# Patient Record
Sex: MALE | Race: WHITE | Employment: OTHER | URBAN - METROPOLITAN AREA
[De-identification: names, ages, dates, MRNs, and addresses within clinical notes are randomized per-mention and may not be internally consistent; named-entity substitution may affect disease eponyms.]

---

## 2020-03-04 ENCOUNTER — OFFICE VISIT (OUTPATIENT)
Dept: PRIMARY CARE CLINIC | Age: 70
End: 2020-03-04
Payer: MEDICARE

## 2020-03-04 VITALS
HEART RATE: 90 BPM | SYSTOLIC BLOOD PRESSURE: 120 MMHG | WEIGHT: 221 LBS | RESPIRATION RATE: 14 BRPM | DIASTOLIC BLOOD PRESSURE: 78 MMHG | TEMPERATURE: 98.3 F | BODY MASS INDEX: 29.93 KG/M2 | OXYGEN SATURATION: 98 % | HEIGHT: 72 IN

## 2020-03-04 PROBLEM — M54.2 CHRONIC NECK PAIN: Status: ACTIVE | Noted: 2020-03-04

## 2020-03-04 PROBLEM — G47.00 INSOMNIA: Status: ACTIVE | Noted: 2020-03-04

## 2020-03-04 PROBLEM — M48.02 SPINAL STENOSIS, CERVICAL REGION: Status: ACTIVE | Noted: 2017-10-31

## 2020-03-04 PROBLEM — R91.8 MULTIPLE PULMONARY NODULES: Status: ACTIVE | Noted: 2020-03-04

## 2020-03-04 PROBLEM — E11.9 TYPE 2 DIABETES MELLITUS WITHOUT COMPLICATION, WITHOUT LONG-TERM CURRENT USE OF INSULIN (HCC): Status: ACTIVE | Noted: 2020-03-04

## 2020-03-04 PROBLEM — G89.29 CHRONIC NECK PAIN: Status: ACTIVE | Noted: 2020-03-04

## 2020-03-04 PROBLEM — R97.20 ELEVATED PSA: Status: ACTIVE | Noted: 2020-03-04

## 2020-03-04 LAB — HBA1C MFR BLD: 6.7 %

## 2020-03-04 PROCEDURE — 83036 HEMOGLOBIN GLYCOSYLATED A1C: CPT | Performed by: NURSE PRACTITIONER

## 2020-03-04 PROCEDURE — 99203 OFFICE O/P NEW LOW 30 MIN: CPT | Performed by: NURSE PRACTITIONER

## 2020-03-04 RX ORDER — AMOXICILLIN 500 MG/1
500 CAPSULE ORAL 3 TIMES DAILY
Qty: 30 CAPSULE | Refills: 0 | Status: SHIPPED | OUTPATIENT
Start: 2020-03-04 | End: 2020-03-14

## 2020-03-04 RX ORDER — GLIPIZIDE 10 MG/1
1 TABLET, FILM COATED, EXTENDED RELEASE ORAL 2 TIMES DAILY
COMMUNITY
Start: 2020-02-17 | End: 2020-07-16 | Stop reason: SDUPTHER

## 2020-03-04 RX ORDER — TRAZODONE HYDROCHLORIDE 50 MG/1
50 TABLET ORAL NIGHTLY PRN
Qty: 30 TABLET | Refills: 0 | Status: SHIPPED | OUTPATIENT
Start: 2020-03-04 | End: 2020-06-01

## 2020-03-04 RX ORDER — CITALOPRAM 20 MG/1
20 TABLET ORAL DAILY
COMMUNITY
End: 2020-07-16 | Stop reason: SDUPTHER

## 2020-03-04 RX ORDER — BENZONATATE 200 MG/1
200 CAPSULE ORAL 3 TIMES DAILY PRN
Qty: 30 CAPSULE | Refills: 0 | Status: SHIPPED | OUTPATIENT
Start: 2020-03-04 | End: 2020-03-11

## 2020-03-04 SDOH — ECONOMIC STABILITY: TRANSPORTATION INSECURITY
IN THE PAST 12 MONTHS, HAS LACK OF TRANSPORTATION KEPT YOU FROM MEETINGS, WORK, OR FROM GETTING THINGS NEEDED FOR DAILY LIVING?: NO

## 2020-03-04 SDOH — ECONOMIC STABILITY: INCOME INSECURITY: HOW HARD IS IT FOR YOU TO PAY FOR THE VERY BASICS LIKE FOOD, HOUSING, MEDICAL CARE, AND HEATING?: NOT HARD AT ALL

## 2020-03-04 SDOH — ECONOMIC STABILITY: TRANSPORTATION INSECURITY
IN THE PAST 12 MONTHS, HAS THE LACK OF TRANSPORTATION KEPT YOU FROM MEDICAL APPOINTMENTS OR FROM GETTING MEDICATIONS?: NO

## 2020-03-04 SDOH — ECONOMIC STABILITY: FOOD INSECURITY: WITHIN THE PAST 12 MONTHS, THE FOOD YOU BOUGHT JUST DIDN'T LAST AND YOU DIDN'T HAVE MONEY TO GET MORE.: NEVER TRUE

## 2020-03-04 SDOH — HEALTH STABILITY: MENTAL HEALTH: HOW OFTEN DO YOU HAVE A DRINK CONTAINING ALCOHOL?: NEVER

## 2020-03-04 SDOH — ECONOMIC STABILITY: FOOD INSECURITY: WITHIN THE PAST 12 MONTHS, YOU WORRIED THAT YOUR FOOD WOULD RUN OUT BEFORE YOU GOT MONEY TO BUY MORE.: NEVER TRUE

## 2020-03-04 ASSESSMENT — ENCOUNTER SYMPTOMS
SHORTNESS OF BREATH: 1
NAUSEA: 0
VOICE CHANGE: 0
ABDOMINAL PAIN: 0
CONSTIPATION: 0
WHEEZING: 0
VOMITING: 0
EYE PAIN: 0
COUGH: 0
BACK PAIN: 0
ABDOMINAL DISTENTION: 0
DIARRHEA: 0
SORE THROAT: 0
COLOR CHANGE: 0
BLOOD IN STOOL: 0
TROUBLE SWALLOWING: 0
EYE ITCHING: 0
CHEST TIGHTNESS: 0
EYE DISCHARGE: 0

## 2020-03-04 ASSESSMENT — PATIENT HEALTH QUESTIONNAIRE - PHQ9
SUM OF ALL RESPONSES TO PHQ QUESTIONS 1-9: 0
2. FEELING DOWN, DEPRESSED OR HOPELESS: 0
1. LITTLE INTEREST OR PLEASURE IN DOING THINGS: 0
SUM OF ALL RESPONSES TO PHQ9 QUESTIONS 1 & 2: 0
SUM OF ALL RESPONSES TO PHQ QUESTIONS 1-9: 0
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
## 2020-04-29 ENCOUNTER — TELEPHONE (OUTPATIENT)
Dept: PRIMARY CARE CLINIC | Age: 70
End: 2020-04-29

## 2020-06-01 ENCOUNTER — VIRTUAL VISIT (OUTPATIENT)
Dept: PRIMARY CARE CLINIC | Age: 70
End: 2020-06-01
Payer: MEDICARE

## 2020-06-01 VITALS — BODY MASS INDEX: 29.84 KG/M2 | WEIGHT: 220 LBS

## 2020-06-01 PROBLEM — Z80.0 FAMILY HISTORY OF COLON CANCER: Chronic | Status: ACTIVE | Noted: 2020-06-01

## 2020-06-01 PROCEDURE — 99214 OFFICE O/P EST MOD 30 MIN: CPT | Performed by: NURSE PRACTITIONER

## 2020-06-01 RX ORDER — TRAZODONE HYDROCHLORIDE 100 MG/1
100 TABLET ORAL NIGHTLY PRN
Qty: 30 TABLET | Refills: 0 | Status: SHIPPED | OUTPATIENT
Start: 2020-06-01 | End: 2020-12-07 | Stop reason: SINTOL

## 2020-06-01 ASSESSMENT — ENCOUNTER SYMPTOMS
ABDOMINAL DISTENTION: 0
COLOR CHANGE: 0
ABDOMINAL PAIN: 0
DIARRHEA: 0
BACK PAIN: 0
CONSTIPATION: 0
EYE PAIN: 0
BLOOD IN STOOL: 0
TROUBLE SWALLOWING: 0
SHORTNESS OF BREATH: 1
NAUSEA: 0
EYE DISCHARGE: 0
WHEEZING: 0
EYE ITCHING: 0
COUGH: 0
CHEST TIGHTNESS: 0
SORE THROAT: 0
VOMITING: 0
VOICE CHANGE: 0

## 2020-06-01 NOTE — PATIENT INSTRUCTIONS
COMPLETE FASTING LABS ASAP. DECLINES CSCOPE. WILLING TO DO HOME COLON CANCER SCREEN. COLOGUARD KIT ORDERED. CONTINUE PER PULM TO MONITOR LUNG NODULES ANNUALLY.  CT DUE 3/2021    MONITOR PSA ANNUALLY

## 2020-06-01 NOTE — PROGRESS NOTES
2020        TELEHEALTH EVALUATION -- Audio/Visual (During BBJAB-28 public health emergency)    HPI:    Dwain Lord (:  1950) has requested an audio/video evaluation for the following concern(s): chronic visit. Pt seen via doxy. me virtual visit. DMII:  On glipizide. Last A1C 6.7  Never been on metformin. Doesn't check glu at home. He was on statin in the past and caused too much joint pain so he stopped taking it  Not on ACEI. Doesn't want to take any more medication. Due for diabetic eye exam.   Pt never returned for fasting labs ordered in march. He will complete them this wk. PULM:  Former smoker. Mild COPD. Follows with pulm for scattered pulmonary nodules. Has CT chest annually from previous employer. Recent CT 3/2/2020 and was stable. ANXIETY:  On celexa. Doing well. No problems. SLEEP:  rx'd trazadone at last visit for insomnia. This took too long to kick in. He would like to go to sleep earlier. Can't fall asleep before 4 am, no matter what time he takes the trazadone. Would like to increase dose. PSA:  Last psa 2019 4.3 has been approx 4 since 2017. Denies any problems urinating. COLON CA SCREEN:  +FH colon cancer (twin brother). Had cscope about 10 yrs ago and was normal. Has refused any further colonoscopy. He is willing to do home testing, cologuard. Due for pneumonia and tetanus vaccines. Refuses peneumonia vaccine. States he will get a tetanus shot sometime soon. Due for AWV. Due for labs after     Review of Systems   Constitutional: Negative for activity change, appetite change, chills, fatigue and unexpected weight change. HENT: Negative for congestion, ear pain, hearing loss, nosebleeds, postnasal drip, sneezing, sore throat, trouble swallowing and voice change. Eyes: Negative for pain, discharge and itching. Respiratory: Positive for shortness of breath. Negative for cough, chest tightness and wheezing.          Pulmonary nodules treatment and/or call 911 if deemed necessary. Patient identification was verified at the start of the visit: Yes    Total time spent on this encounter: Not billed by time    Services were provided through a video synchronous discussion virtually to substitute for in-person clinic visit. Patient and provider were located at their individual homes. --JORDAN Bailon - CNP on 6/1/2020 at 4:14 PM    An electronic signature was used to authenticate this note.

## 2020-07-16 RX ORDER — GLIPIZIDE 10 MG/1
10 TABLET, FILM COATED, EXTENDED RELEASE ORAL 2 TIMES DAILY
Qty: 60 TABLET | Refills: 2 | Status: SHIPPED | OUTPATIENT
Start: 2020-07-16 | End: 2020-08-20 | Stop reason: SDUPTHER

## 2020-07-16 RX ORDER — CITALOPRAM 20 MG/1
20 TABLET ORAL DAILY
Qty: 30 TABLET | Refills: 2 | Status: SHIPPED | OUTPATIENT
Start: 2020-07-16 | End: 2020-08-20 | Stop reason: SDUPTHER

## 2020-07-16 NOTE — TELEPHONE ENCOUNTER
Patient called needing refills on the following medications:   Completely out of     citalopram (CELEXA) 20 MG tablet [[id number]]     Order Details   Dose: 20 mg  Route: Oral  Frequency: DAILY    Dispense Quantity: --  Refills: --  Fills remaining: --             Sig: Take 20 mg by mouth daily        Also about a week left of the following:     glipiZIDE (GLUCOTROL XL) 10 MG extended release tablet [[id number]]     Order Details   Dose: 1 tablet  Route: Oral  Frequency: 2 TIMES DAILY    Dispense Quantity: --  Refills: --  Fills remaining: --             Sig: Take 1 tablet by mouth 2 times daily                 [address]    Thank you

## 2020-08-05 RX ORDER — GLUCOSAMINE HCL/CHONDROITIN SU 500-400 MG
CAPSULE ORAL
Qty: 100 STRIP | Refills: 3 | Status: SHIPPED | OUTPATIENT
Start: 2020-08-05 | End: 2020-08-06 | Stop reason: SDUPTHER

## 2020-08-05 NOTE — TELEPHONE ENCOUNTER
One touch Ultra test strip  Medication:   Requested Prescriptions     Pending Prescriptions Disp Refills    blood glucose monitor strips 30 strip 11     Sig: One Touch Ultra Test strips     Last appt: 6/1/2020   Next appt: Visit date not found    Last Labs DM:   Lab Results   Component Value Date    LABA1C 6.7 03/04/2020

## 2020-08-05 NOTE — TELEPHONE ENCOUNTER
----- Message from Priyanka Suehayley sent at 8/5/2020  5:11 PM EDT -----  Subject: Message to Provider    QUESTIONS  Information for Provider? Pt called and is needing a prescription sent   over for test strips. original script written by different doctor. ---------------------------------------------------------------------------  --------------  Ino Spearing INFO  What is the best way for the office to contact you? OK to leave message on   voicemail  Preferred Call Back Phone Number? [phone]  ---------------------------------------------------------------------------  --------------  SCRIPT ANSWERS  Relationship to Patient?  Self

## 2020-08-06 ENCOUNTER — TELEPHONE (OUTPATIENT)
Dept: PRIMARY CARE CLINIC | Age: 70
End: 2020-08-06

## 2020-08-06 RX ORDER — GLUCOSAMINE HCL/CHONDROITIN SU 500-400 MG
CAPSULE ORAL
Qty: 100 STRIP | Refills: 3 | Status: SHIPPED | OUTPATIENT
Start: 2020-08-06

## 2020-08-06 RX ORDER — GLUCOSAMINE HCL/CHONDROITIN SU 500-400 MG
100 CAPSULE ORAL 2 TIMES DAILY
Qty: 100 STRIP | Refills: 1 | Status: CANCELLED | OUTPATIENT
Start: 2020-08-06

## 2020-08-06 RX ORDER — DIPHENHYDRAMINE HYDROCHLORIDE 25 MG/1
CAPSULE, LIQUID FILLED ORAL
Qty: 1 KIT | Refills: 1 | Status: SHIPPED | OUTPATIENT
Start: 2020-08-06

## 2020-08-06 RX ORDER — DIPHENHYDRAMINE HYDROCHLORIDE 25 MG/1
CAPSULE, LIQUID FILLED ORAL
COMMUNITY
End: 2020-08-06 | Stop reason: SDUPTHER

## 2020-08-06 RX ORDER — DIPHENHYDRAMINE HYDROCHLORIDE 25 MG/1
CAPSULE, LIQUID FILLED ORAL
Qty: 1 KIT | Refills: 0 | Status: CANCELLED | OUTPATIENT
Start: 2020-08-06

## 2020-08-06 RX ORDER — GLUCOSAMINE HCL/CHONDROITIN SU 500-400 MG
100 CAPSULE ORAL
COMMUNITY

## 2020-08-06 NOTE — TELEPHONE ENCOUNTER
Medication:   Requested Prescriptions     Pending Prescriptions Disp Refills    Blood Glucose Monitoring Suppl (BLOOD GLUCOSE MONITOR SYSTEM) w/Device KIT 1 kit 0     Sig: by Does not apply route Whatever  Machine insurance  Covers  Diabetes e11.9    blood glucose monitor strips 100 strip 1     Si strips by Other route 2 times daily Test 2times a day & as needed for symptoms of irregular blood glucose. Whatever  Insurance  Covers  Diabetes e11.9    Lancets Misc. MISC 100 each 1     Sig: by Does not apply route 2 times daily       Last Filled:      Patient Phone Number: [phone] (home)     Last appt: 2020   Next appt: Visit date not found    Last Labs DM:   Lab Results   Component Value Date    LABA1C 6.7 2020       Last OARRS: No flowsheet data found.     Preferred Pharmacy:   [address] [facility] [place] [address]  Phone: [phone] Fax: [phone]

## 2020-08-06 NOTE — TELEPHONE ENCOUNTER
Pharmacy needs a clarification for max dose per day on the   blood glucose monitor strips [[id number]]     Order Details   Dose: 100 strip  Route: Other  Frequency: --    Dispense Quantity: --  Refills: --  Fills remaining: --             Si strips by Other route Test 2times a day & as needed for symptoms of irregular blood glucose.    Consuelo Petersen [id number] [facility], [facility]    Pharmacy Comments: --

## 2020-08-20 RX ORDER — GLIPIZIDE 10 MG/1
10 TABLET, FILM COATED, EXTENDED RELEASE ORAL 2 TIMES DAILY
Qty: 180 TABLET | Refills: 0 | Status: SHIPPED | OUTPATIENT
Start: 2020-08-20 | End: 2020-10-23 | Stop reason: SDUPTHER

## 2020-08-20 RX ORDER — CITALOPRAM 20 MG/1
20 TABLET ORAL DAILY
Qty: 90 TABLET | Refills: 0 | Status: SHIPPED | OUTPATIENT
Start: 2020-08-20 | End: 2020-10-23 | Stop reason: SDUPTHER

## 2020-08-20 NOTE — TELEPHONE ENCOUNTER
[employer] mailorder called for this refill:     Fax # [phone]    citalopram (CELEXA) 20 MG tablet [[id number]    glipiZIDE (GLUCOTROL XL) 10 MG extended release tablet [[id number]

## 2020-08-20 NOTE — TELEPHONE ENCOUNTER
Medication:   Requested Prescriptions     Pending Prescriptions Disp Refills    citalopram (CELEXA) 20 MG tablet 90 tablet 0     Sig: Take 1 tablet by mouth daily    glipiZIDE (GLUCOTROL XL) 10 MG extended release tablet 180 tablet 0     Sig: Take 1 tablet by mouth 2 times daily       Last Filled:      Patient Phone Number: [phone] (home)     Last appt: 6/1/2020   Next appt: Visit date not found    Last Labs DM:   Lab Results   Component Value Date    LABA1C 6.7 03/04/2020       Last OARRS: No flowsheet data found.     Preferred Pharmacy:   [address] [place] [address]  Phone: [phone] Fax: [phone]

## 2020-09-01 ENCOUNTER — TELEPHONE (OUTPATIENT)
Dept: PRIMARY CARE CLINIC | Age: 70
End: 2020-09-01

## 2020-09-01 NOTE — TELEPHONE ENCOUNTER
----- Message from Grecia Fan sent at 9/1/2020  3:52 PM EDT -----  Subject: Message to Provider    QUESTIONS  Information for Provider? pt stated he needs a renewal handicap sticker   ---------------------------------------------------------------------------  --------------  [address]  What is the best way for the office to contact you? OK to leave message on   voicemail  Preferred Call Back Phone Number? [phone]  ---------------------------------------------------------------------------  --------------  SCRIPT ANSWERS  Relationship to Patient?  Self

## 2020-09-08 ENCOUNTER — OFFICE VISIT (OUTPATIENT)
Dept: PRIMARY CARE CLINIC | Age: 70
End: 2020-09-08
Payer: MEDICARE

## 2020-09-08 VITALS
HEIGHT: 72 IN | DIASTOLIC BLOOD PRESSURE: 72 MMHG | BODY MASS INDEX: 31.18 KG/M2 | OXYGEN SATURATION: 97 % | WEIGHT: 230.2 LBS | SYSTOLIC BLOOD PRESSURE: 110 MMHG | RESPIRATION RATE: 16 BRPM | TEMPERATURE: 97.4 F | HEART RATE: 98 BPM

## 2020-09-08 DIAGNOSIS — E11.9 TYPE 2 DIABETES MELLITUS WITHOUT COMPLICATION, WITHOUT LONG-TERM CURRENT USE OF INSULIN (HCC): ICD-10-CM

## 2020-09-08 DIAGNOSIS — R97.20 ELEVATED PSA: ICD-10-CM

## 2020-09-08 PROBLEM — G89.29 CHRONIC PAIN OF LEFT KNEE: Status: ACTIVE | Noted: 2020-09-08

## 2020-09-08 PROBLEM — M25.562 CHRONIC PAIN OF LEFT KNEE: Status: ACTIVE | Noted: 2020-09-08

## 2020-09-08 LAB
A/G RATIO: 1.8 (ref 1.1–2.2)
ALBUMIN SERPL-MCNC: 4.4 G/DL (ref 3.4–5)
ALP BLD-CCNC: 111 U/L (ref 40–129)
ALT SERPL-CCNC: 14 U/L (ref 10–40)
ANION GAP SERPL CALCULATED.3IONS-SCNC: 11 MMOL/L (ref 3–16)
AST SERPL-CCNC: 17 U/L (ref 15–37)
BASOPHILS ABSOLUTE: 0.1 K/UL (ref 0–0.2)
BASOPHILS RELATIVE PERCENT: 0.6 %
BILIRUB SERPL-MCNC: 0.5 MG/DL (ref 0–1)
BUN BLDV-MCNC: 12 MG/DL (ref 7–20)
CALCIUM SERPL-MCNC: 9.3 MG/DL (ref 8.3–10.6)
CHLORIDE BLD-SCNC: 101 MMOL/L (ref 99–110)
CHOLESTEROL, TOTAL: 222 MG/DL (ref 0–199)
CO2: 28 MMOL/L (ref 21–32)
CREAT SERPL-MCNC: 1.4 MG/DL (ref 0.8–1.3)
CREATININE URINE: 193.1 MG/DL (ref 39–259)
EOSINOPHILS ABSOLUTE: 0.3 K/UL (ref 0–0.6)
EOSINOPHILS RELATIVE PERCENT: 2.9 %
GFR AFRICAN AMERICAN: >60
GFR NON-AFRICAN AMERICAN: 50
GLOBULIN: 2.4 G/DL
GLUCOSE BLD-MCNC: 232 MG/DL (ref 70–99)
HBA1C MFR BLD: 8.4 %
HCT VFR BLD CALC: 44.4 % (ref 40.5–52.5)
HDLC SERPL-MCNC: 46 MG/DL (ref 40–60)
HEMOGLOBIN: 14.8 G/DL (ref 13.5–17.5)
LDL CHOLESTEROL CALCULATED: 149 MG/DL
LYMPHOCYTES ABSOLUTE: 2.1 K/UL (ref 1–5.1)
LYMPHOCYTES RELATIVE PERCENT: 23.3 %
MCH RBC QN AUTO: 30.6 PG (ref 26–34)
MCHC RBC AUTO-ENTMCNC: 33.5 G/DL (ref 31–36)
MCV RBC AUTO: 91.3 FL (ref 80–100)
MICROALBUMIN UR-MCNC: <1.2 MG/DL
MICROALBUMIN/CREAT UR-RTO: NORMAL MG/G (ref 0–30)
MONOCYTES ABSOLUTE: 0.7 K/UL (ref 0–1.3)
MONOCYTES RELATIVE PERCENT: 7.7 %
NEUTROPHILS ABSOLUTE: 6 K/UL (ref 1.7–7.7)
NEUTROPHILS RELATIVE PERCENT: 65.5 %
PDW BLD-RTO: 13.2 % (ref 12.4–15.4)
PLATELET # BLD: 199 K/UL (ref 135–450)
PMV BLD AUTO: 8 FL (ref 5–10.5)
POTASSIUM SERPL-SCNC: 4.8 MMOL/L (ref 3.5–5.1)
RBC # BLD: 4.86 M/UL (ref 4.2–5.9)
SODIUM BLD-SCNC: 140 MMOL/L (ref 136–145)
TOTAL PROTEIN: 6.8 G/DL (ref 6.4–8.2)
TRIGL SERPL-MCNC: 137 MG/DL (ref 0–150)
VLDLC SERPL CALC-MCNC: 27 MG/DL
WBC # BLD: 9.2 K/UL (ref 4–11)

## 2020-09-08 PROCEDURE — 4040F PNEUMOC VAC/ADMIN/RCVD: CPT | Performed by: NURSE PRACTITIONER

## 2020-09-08 PROCEDURE — 3017F COLORECTAL CA SCREEN DOC REV: CPT | Performed by: NURSE PRACTITIONER

## 2020-09-08 PROCEDURE — 99214 OFFICE O/P EST MOD 30 MIN: CPT | Performed by: NURSE PRACTITIONER

## 2020-09-08 PROCEDURE — 83036 HEMOGLOBIN GLYCOSYLATED A1C: CPT | Performed by: NURSE PRACTITIONER

## 2020-09-08 PROCEDURE — G8417 CALC BMI ABV UP PARAM F/U: HCPCS | Performed by: NURSE PRACTITIONER

## 2020-09-08 PROCEDURE — G8427 DOCREV CUR MEDS BY ELIG CLIN: HCPCS | Performed by: NURSE PRACTITIONER

## 2020-09-08 PROCEDURE — 3052F HG A1C>EQUAL 8.0%<EQUAL 9.0%: CPT | Performed by: NURSE PRACTITIONER

## 2020-09-08 PROCEDURE — 1036F TOBACCO NON-USER: CPT | Performed by: NURSE PRACTITIONER

## 2020-09-08 PROCEDURE — 1123F ACP DISCUSS/DSCN MKR DOCD: CPT | Performed by: NURSE PRACTITIONER

## 2020-09-08 PROCEDURE — 2022F DILAT RTA XM EVC RTNOPTHY: CPT | Performed by: NURSE PRACTITIONER

## 2020-09-08 RX ORDER — MELATONIN 10 MG
1 CAPSULE ORAL NIGHTLY PRN
Qty: 30 CAPSULE | Refills: 2 | Status: SHIPPED | OUTPATIENT
Start: 2020-09-08 | End: 2021-03-08

## 2020-09-08 RX ORDER — LANCETS
EACH MISCELLANEOUS
COMMUNITY
Start: 2020-08-06

## 2020-09-08 ASSESSMENT — PATIENT HEALTH QUESTIONNAIRE - PHQ9
SUM OF ALL RESPONSES TO PHQ QUESTIONS 1-9: 0
1. LITTLE INTEREST OR PLEASURE IN DOING THINGS: 0
SUM OF ALL RESPONSES TO PHQ9 QUESTIONS 1 & 2: 0
SUM OF ALL RESPONSES TO PHQ QUESTIONS 1-9: 0
2. FEELING DOWN, DEPRESSED OR HOPELESS: 0

## 2020-09-08 ASSESSMENT — ENCOUNTER SYMPTOMS
ABDOMINAL DISTENTION: 0
COUGH: 0
ABDOMINAL PAIN: 0
BACK PAIN: 0
EYE PAIN: 0
CONSTIPATION: 0
CHEST TIGHTNESS: 0
EYE DISCHARGE: 0
BLOOD IN STOOL: 0
COLOR CHANGE: 0
TROUBLE SWALLOWING: 0
EYE ITCHING: 0
SHORTNESS OF BREATH: 1
VOICE CHANGE: 0
DIARRHEA: 0
SORE THROAT: 0
WHEEZING: 0
NAUSEA: 0
VOMITING: 0

## 2020-09-08 NOTE — PATIENT INSTRUCTIONS
CHECK LABS TODAY. PROCEED PENDING RESULTS. ADD JANUVIA TO GLIPIZIDE FOR YOUR DIABETES. FOCUS ON DIET. LOWER SUGAR INTAKE. CONTINUE TO MONITOR GLUCOSE AT HOME. CALL IF ANY PROBLEMS WITH MEDICATION. CALL -[facility] TO SCHEDULE ULTRASOUND OF RIGHT ANKLE. LETTER FOR DMV COMPLETED. TRIAL OF MELATONIN FOR SLEEP. COMPLETE COLOGUARD KIT AND MAIL IN. SEND IN COPY OF LIFELINE RESULTS.

## 2020-09-08 NOTE — PROGRESS NOTES
Practitioner)  JORDAN Santana - CNP as PCP - [facility] Empaneled Provider    Chief Complaint   Patient presents with    Diabetes    Anxiety      HPI:   Pt is here for a chronic visit. DMII:  On glipizide. Tried metformin. Didn't tolerate due to \"leg burning/pain\"  Checks occasionally at home and running 160s. Diet has been poor. Eating sweets. He was on statin in the past and caused too much joint pain so he stopped taking it  Not on ACEI. Doesn't want to take any more medication. Due for diabetic eye exam    ANXIETY:  On celexa. Doing well. No refill needed. SLEEP:  Not taking trazadone. Made his ears ring. Has been sleeping well lately except for the past few nights. PULM:  Former smoker. Mild COPD. Follows with pulm for scattered pulmonary nodules. Has CT chest annually from previous employer. Recent CT 3/2/2020 and was stable. PSA:  Last psa 9/2019 4.3 has been approx 4 since 2017. Denies any problems urinating. COLON CA SCREEN:  +FH colon cancer (twin brother). Had cscope about 10 yrs ago and was normal. Has refused any further colonoscopy. He received cologuard kit in the mail, but has not completed this. Due for pneumonia and tetanus vaccines. Refuses peneumonia vaccine. States he will get a tetanus shot sometime soon. Due for AWV. Never returned for fasting labs as previously ordered. Had recent lifeline screening completed. Had carotid study, bone density and US. No results yet. ANKLE:  Having pain in right ankle when he crosses his legs. Feels a nodule that is painful on his outer ankle. No swelling or discoloration. Some mild varicose veins. ROS:  Review of Systems   Constitutional: Negative for activity change, appetite change, chills, fatigue and unexpected weight change. HENT: Negative for congestion, ear pain, hearing loss, nosebleeds, postnasal drip, sneezing, sore throat, trouble swallowing and voice change.     Eyes: Negative for pain, discharge and itching. Respiratory: Positive for shortness of breath. Negative for cough, chest tightness and wheezing. Pulmonary nodules monitored per annual CT per pulm   Cardiovascular: Negative for chest pain, palpitations and leg swelling. Gastrointestinal: Negative for abdominal distention, abdominal pain, blood in stool, constipation, diarrhea, nausea and vomiting. Endocrine: Negative for cold intolerance and heat intolerance. Genitourinary: Negative for difficulty urinating and dysuria. History of elevated PSA   Musculoskeletal: Negative for arthralgias, back pain, neck pain and neck stiffness. Skin: Negative for color change, pallor and rash. Neurological: Negative for dizziness, tremors, seizures, numbness and headaches. Hematological: Does not bruise/bleed easily. Psychiatric/Behavioral: Positive for sleep disturbance. Negative for agitation. The patient is not nervous/anxious. VITALS:  /72   Pulse 98   Temp 97.4 °F (36.3 °C) (Oral)   Resp 16   Ht 6' (1.829 m)   Wt 230 lb 3.2 oz (104.4 kg)   SpO2 97%   BMI 31.22 kg/m²    Wt Readings from Last 3 Encounters:   09/08/20 230 lb 3.2 oz (104.4 kg)   06/01/20 220 lb (99.8 kg)   03/04/20 221 lb (100.2 kg)       PE:  Physical Exam  Vitals signs and nursing note reviewed. Constitutional:       General: He is not in acute distress. Appearance: Normal appearance. He is well-developed and normal weight. He is not diaphoretic. HENT:      Head: Normocephalic and atraumatic. Right Ear: Tympanic membrane, ear canal and external ear normal.      Left Ear: Tympanic membrane, ear canal and external ear normal.      Nose: No congestion or rhinorrhea. Neck:      Musculoskeletal: Normal range of motion. No neck rigidity. Vascular: No carotid bruit. Cardiovascular:      Rate and Rhythm: Normal rate and regular rhythm. Pulses:           Dorsalis pedis pulses are 3+ on the right side.         Posterior tibial pulses are 3+ on the right side. Heart sounds: Normal heart sounds. No murmur. No friction rub. Pulmonary:      Effort: Pulmonary effort is normal. No respiratory distress. Breath sounds: No wheezing, rhonchi or rales. Abdominal:      General: Abdomen is flat. There is no distension. Palpations: Abdomen is soft. Musculoskeletal: Normal range of motion. General: No tenderness or deformity. Right lower leg: No edema. Left lower leg: No edema. Right foot: Normal range of motion. No deformity. Feet:    Feet:      Right foot:      Skin integrity: Skin integrity normal.      Toenail Condition: Right toenails are normal.   Lymphadenopathy:      Cervical: No cervical adenopathy. Skin:     General: Skin is warm and dry. Coloration: Skin is not pale. Findings: No erythema or rash. Neurological:      General: No focal deficit present. Mental Status: He is alert and oriented to person, place, and time. Motor: No weakness. Gait: Gait normal.   Psychiatric:         Mood and Affect: Mood normal.         Behavior: Behavior normal.      Visual inspection:  Deformity/amputation: absent  Skin lesions/pre-ulcerative calluses: absent  Edema: right- negative, left- negative    Sensory exam:  Monofilament sensation: normal  (minimum of 5 random plantar locations tested, avoiding callused areas - > 1 area with absence of sensation is + for neuropathy)    Plus at least one of the following:  Pulses: normal,   Pinprick: N/A  Proprioception: N/A  Vibration (128 Hz): N/A      Results for POC orders placed in visit on 09/08/20   POCT glycosylated hemoglobin (Hb A1C)   Result Value Ref Range    Hemoglobin A1C 8.4 %       ASSESSMENT/PLAN:  1. Type 2 diabetes mellitus without complication, without long-term current use of insulin ([facility])  Discussed A1C of 8.4. pt admits diet has been poor. Will add [place] and [place]. Discussed med in detail. Will call if any problems.  Has not tolerated metformin in the past. Continue on glipizide BID. Will focus on diet and cut out sweets. Will recheck in 3mo. Advised due for eye exam.   - POCT glycosylated hemoglobin (Hb A1C)  - SITagliptin (JANUVIA) 100 MG tablet; Take 1 tablet by mouth daily  Dispense: 90 tablet; Refill: 0    2. Multiple pulmonary nodules  Continue per pulm annually for monitoring of CT. Last CT 3/2020    3. Anxiety state  Stable on current regimen. No refill needed today. 4. Elevated PSA  Check levels. History of PSA 4 the past few yrs. 5. Family history of colon cancer  Refuses cscope. Strongly encouraged to complete cologuard, which he has at home. 6. Insomnia, unspecified type  Trial of melatonin PRN. Didn't tolerate higher dose of trazadone.   - Melatonin 10 MG CAPS; Take 1 capsule by mouth nightly as needed (sleep)  Dispense: 30 capsule; Refill: 2    7. Chronic pain of right ankle  Check US of RLE ankle. ?cyst. Pt will call and schedule. - US EXTREMITY RIGHT NON VASC LIMITED; Future    8. Chronic left knee pain  Letter for DMV printed and given to pt      Return in about 3 months (around 12/8/2020) for chronic visit.      Electronically signed by JORDAN Medina CNP on 9/8/2020 at 3:45 PM

## 2020-09-08 NOTE — LETTER
[address] [place] [address]  Phone: [phone]  Fax: [phone]    JORDAN Mendoza CNP        September 8, 2020     Patient: Deepthi Sprague   YOB: 1950   Date of Visit: 9/8/2020       To Whom It May Concern: It is my medical opinion that Deepthi Sprague requires a disability parking placard for the following reasons:  He has limited walking ability due to an arthritic and an orthopedic condition. Duration of need: 5 years    If you have any questions or concerns, please don't hesitate to call.     Sincerely,        JORDAN Mendoza CNP

## 2020-09-09 LAB
ESTIMATED AVERAGE GLUCOSE: 200.1 MG/DL
HBA1C MFR BLD: 8.6 %
PROSTATE SPECIFIC ANTIGEN: 3.74 NG/ML (ref 0–4)

## 2020-09-10 ENCOUNTER — HOSPITAL ENCOUNTER (OUTPATIENT)
Dept: ULTRASOUND IMAGING | Age: 70
Discharge: HOME | End: 2020-09-10
Payer: MEDICARE

## 2020-09-10 PROCEDURE — 76882 US LMTD JT/FCL EVL NVASC XTR: CPT

## 2020-10-22 NOTE — TELEPHONE ENCOUNTER
Medication:   Requested Prescriptions     Pending Prescriptions Disp Refills    glipiZIDE (GLUCOTROL XL) 10 MG extended release tablet 180 tablet 0     Sig: Take 1 tablet by mouth 2 times daily    citalopram (CELEXA) 20 MG tablet 90 tablet 0     Sig: Take 1 tablet by mouth daily        Last Filled:      Patient Phone Number: [phone] (home)     Last appt: 9/8/2020   Next appt: 12/8/2020    Last OARRS: No flowsheet data found.     Preferred Pharmacy:   [address] [place] [address]  Phone: [phone] Fax: [phone]

## 2020-10-23 RX ORDER — GLIPIZIDE 10 MG/1
10 TABLET, FILM COATED, EXTENDED RELEASE ORAL 2 TIMES DAILY
Qty: 180 TABLET | Refills: 0 | Status: SHIPPED | OUTPATIENT
Start: 2020-10-23 | End: 2020-12-07 | Stop reason: SDUPTHER

## 2020-10-23 RX ORDER — CITALOPRAM 20 MG/1
20 TABLET ORAL DAILY
Qty: 90 TABLET | Refills: 0 | Status: SHIPPED | OUTPATIENT
Start: 2020-10-23 | End: 2020-12-07 | Stop reason: SDUPTHER

## 2020-12-07 ENCOUNTER — OFFICE VISIT (OUTPATIENT)
Dept: PRIMARY CARE CLINIC | Age: 70
End: 2020-12-07
Payer: MEDICARE

## 2020-12-07 VITALS
HEART RATE: 88 BPM | TEMPERATURE: 97.6 F | BODY MASS INDEX: 31.37 KG/M2 | HEIGHT: 72 IN | SYSTOLIC BLOOD PRESSURE: 118 MMHG | WEIGHT: 231.6 LBS | DIASTOLIC BLOOD PRESSURE: 80 MMHG | OXYGEN SATURATION: 97 %

## 2020-12-07 DIAGNOSIS — E11.9 TYPE 2 DIABETES MELLITUS WITHOUT COMPLICATION, WITHOUT LONG-TERM CURRENT USE OF INSULIN (HCC): ICD-10-CM

## 2020-12-07 DIAGNOSIS — R97.20 ELEVATED PSA: ICD-10-CM

## 2020-12-07 LAB
HBA1C MFR BLD: 7.4 %
HCT VFR BLD CALC: 47.2 % (ref 40.5–52.5)
HEMOGLOBIN: 16.1 G/DL (ref 13.5–17.5)
MCH RBC QN AUTO: 30.4 PG (ref 26–34)
MCHC RBC AUTO-ENTMCNC: 34.1 G/DL (ref 31–36)
MCV RBC AUTO: 89.1 FL (ref 80–100)
PDW BLD-RTO: 13.4 % (ref 12.4–15.4)
PLATELET # BLD: 223 K/UL (ref 135–450)
PMV BLD AUTO: 7.8 FL (ref 5–10.5)
RBC # BLD: 5.29 M/UL (ref 4.2–5.9)
WBC # BLD: 9.4 K/UL (ref 4–11)

## 2020-12-07 PROCEDURE — G8417 CALC BMI ABV UP PARAM F/U: HCPCS | Performed by: NURSE PRACTITIONER

## 2020-12-07 PROCEDURE — G8427 DOCREV CUR MEDS BY ELIG CLIN: HCPCS | Performed by: NURSE PRACTITIONER

## 2020-12-07 PROCEDURE — 3051F HG A1C>EQUAL 7.0%<8.0%: CPT | Performed by: NURSE PRACTITIONER

## 2020-12-07 PROCEDURE — 3017F COLORECTAL CA SCREEN DOC REV: CPT | Performed by: NURSE PRACTITIONER

## 2020-12-07 PROCEDURE — 4040F PNEUMOC VAC/ADMIN/RCVD: CPT | Performed by: NURSE PRACTITIONER

## 2020-12-07 PROCEDURE — 99214 OFFICE O/P EST MOD 30 MIN: CPT | Performed by: NURSE PRACTITIONER

## 2020-12-07 PROCEDURE — 2022F DILAT RTA XM EVC RTNOPTHY: CPT | Performed by: NURSE PRACTITIONER

## 2020-12-07 PROCEDURE — 83036 HEMOGLOBIN GLYCOSYLATED A1C: CPT | Performed by: NURSE PRACTITIONER

## 2020-12-07 PROCEDURE — 1036F TOBACCO NON-USER: CPT | Performed by: NURSE PRACTITIONER

## 2020-12-07 PROCEDURE — 1123F ACP DISCUSS/DSCN MKR DOCD: CPT | Performed by: NURSE PRACTITIONER

## 2020-12-07 PROCEDURE — G8484 FLU IMMUNIZE NO ADMIN: HCPCS | Performed by: NURSE PRACTITIONER

## 2020-12-07 RX ORDER — GLIPIZIDE 10 MG/1
10 TABLET, FILM COATED, EXTENDED RELEASE ORAL 2 TIMES DAILY
Qty: 180 TABLET | Refills: 0 | Status: SHIPPED | OUTPATIENT
Start: 2020-12-07 | End: 2021-01-27 | Stop reason: SDUPTHER

## 2020-12-07 RX ORDER — CITALOPRAM 20 MG/1
20 TABLET ORAL DAILY
Qty: 90 TABLET | Refills: 0 | Status: SHIPPED | OUTPATIENT
Start: 2020-12-07 | End: 2021-01-27 | Stop reason: SDUPTHER

## 2020-12-07 ASSESSMENT — ENCOUNTER SYMPTOMS
CHEST TIGHTNESS: 0
BACK PAIN: 0
TROUBLE SWALLOWING: 0
CONSTIPATION: 0
NAUSEA: 0
EYE DISCHARGE: 0
ABDOMINAL PAIN: 0
WHEEZING: 0
COLOR CHANGE: 0
SHORTNESS OF BREATH: 1
EYE ITCHING: 0
ABDOMINAL DISTENTION: 0
VOMITING: 0
DIARRHEA: 0
VOICE CHANGE: 0
EYE PAIN: 0
BLOOD IN STOOL: 0
COUGH: 0
SORE THROAT: 0

## 2020-12-07 ASSESSMENT — PATIENT HEALTH QUESTIONNAIRE - PHQ9
SUM OF ALL RESPONSES TO PHQ QUESTIONS 1-9: 0
SUM OF ALL RESPONSES TO PHQ9 QUESTIONS 1 & 2: 0
SUM OF ALL RESPONSES TO PHQ QUESTIONS 1-9: 0
2. FEELING DOWN, DEPRESSED OR HOPELESS: 0
SUM OF ALL RESPONSES TO PHQ QUESTIONS 1-9: 0
1. LITTLE INTEREST OR PLEASURE IN DOING THINGS: 0

## 2020-12-07 NOTE — PATIENT INSTRUCTIONS
Please complete colon cancer screening asap and mail back in. Check fasting labs today. Call to schedule apt with surgeon for right ankle nodule.

## 2020-12-07 NOTE — PROGRESS NOTES
januvia  Tried metformin. Didn't tolerate due to \"leg burning/pain\"  Checks occasionally at home and running 160s. He was on statin in the past and caused too much joint pain so he stopped taking it  Not on ACEI. \"Doesn't want to take any more medication\".   Due for diabetic eye exam    ANXIETY:  On celexa. Doing well. PULM:  Former smoker. Mild COPD. Follows with pulm for scattered pulmonary nodules. Has CT chest annually from previous employer. Recent CT 3/2/2020 and was stable. ORTHO:  Never called for apt for painful nodule on right ankle. PSA:  Last psa 9/2019 4.3 has been approx 4 since 2017. Denies any problems urinating.     COLON CA SCREEN:  +FH colon cancer (twin brother). Had cscope about 10 yrs ago and was normal. Has refused any further colonoscopy. He received cologuard kit in the mail, but has not completed this. Due for pneumonia and tetanus vaccines. refuses both  Refuses flu vaccine. Due for AWV. does not want to completed today. Had recent lifeline screening completed. Had carotid study, bone density and US. ROS:  Review of Systems   Constitutional: Negative for activity change, appetite change, chills, fatigue and unexpected weight change. HENT: Negative for congestion, ear pain, hearing loss, nosebleeds, postnasal drip, sneezing, sore throat, trouble swallowing and voice change. Eyes: Negative for pain, discharge and itching. Respiratory: Positive for shortness of breath (chronic). Negative for cough, chest tightness and wheezing. Pulmonary nodules monitored per annual CT per pulm   Cardiovascular: Negative for chest pain, palpitations and leg swelling. Gastrointestinal: Negative for abdominal distention, abdominal pain, blood in stool, constipation, diarrhea, nausea and vomiting. Endocrine: Negative for cold intolerance and heat intolerance. Genitourinary: Negative for difficulty urinating and dysuria.         History of elevated PSA Musculoskeletal: Negative for arthralgias, back pain, neck pain and neck stiffness. Skin: Negative for color change, pallor and rash. Neurological: Negative for dizziness, tremors, seizures, numbness and headaches. Hematological: Does not bruise/bleed easily. Psychiatric/Behavioral: Positive for sleep disturbance. Negative for agitation. The patient is not nervous/anxious. VITALS:  /80   Pulse 88   Temp 97.6 °F (36.4 °C) (Oral)   Ht 6' (1.829 m)   Wt 231 lb 9.6 oz (105.1 kg)   SpO2 97%   BMI 31.41 kg/m²      PE:  Physical Exam  Vitals signs and nursing note reviewed. Constitutional:       General: He is not in acute distress. Appearance: Normal appearance. He is well-developed and normal weight. He is not diaphoretic. HENT:      Head: Normocephalic and atraumatic. Nose: No congestion or rhinorrhea. Neck:      Musculoskeletal: Normal range of motion. No neck rigidity. Vascular: No carotid bruit. Cardiovascular:      Rate and Rhythm: Normal rate and regular rhythm. Pulses:           Dorsalis pedis pulses are 3+ on the right side. Posterior tibial pulses are 3+ on the right side. Heart sounds: Normal heart sounds. No murmur. No friction rub. Pulmonary:      Effort: Pulmonary effort is normal. No respiratory distress. Breath sounds: No wheezing, rhonchi or rales. Abdominal:      General: Abdomen is flat. There is no distension. Palpations: Abdomen is soft. Musculoskeletal: Normal range of motion. General: No tenderness or deformity. Right lower leg: No edema. Left lower leg: No edema. Right foot: Normal range of motion. No deformity. Feet:    Feet:      Right foot:      Skin integrity: Skin integrity normal.      Toenail Condition: Right toenails are normal.   Lymphadenopathy:      Cervical: No cervical adenopathy. Skin:     General: Skin is warm and dry. Coloration: Skin is not pale.       Findings: No erythema or rash. Neurological:      General: No focal deficit present. Mental Status: He is alert and oriented to person, place, and time. Motor: No weakness. Gait: Gait normal.   Psychiatric:         Mood and Affect: Mood normal.         Behavior: Behavior normal.        Lab Results   Component Value Date    LABA1C 7.4 12/07/2020     Lab Results   Component Value Date    .1 09/08/2020     Lab Results   Component Value Date    WBC 9.2 09/08/2020    HGB 14.8 09/08/2020    HCT 44.4 09/08/2020    MCV 91.3 09/08/2020     09/08/2020     Lab Results   Component Value Date     09/08/2020    K 4.8 09/08/2020     09/08/2020    CO2 28 09/08/2020    BUN 12 09/08/2020    CREATININE 1.4 (H) 09/08/2020    GLUCOSE 232 (H) 09/08/2020    CALCIUM 9.3 09/08/2020    PROT 6.8 09/08/2020    LABALBU 4.4 09/08/2020    BILITOT 0.5 09/08/2020    ALKPHOS 111 09/08/2020    AST 17 09/08/2020    ALT 14 09/08/2020    LABGLOM 50 (A) 09/08/2020    GFRAA >60 09/08/2020    AGRATIO 1.8 09/08/2020    GLOB 2.4 09/08/2020       Lab Results   Component Value Date    PSA 3.74 09/08/2020       ASSESSMENT/PLAN:  1. Type 2 diabetes mellitus without complication, without long-term current use of insulin (HCC)  Check fasting labs today. Proceed pending results. Will continue to work on diet. Encouraged to check glu regularly and keep glu log.     - POCT glycosylated hemoglobin (Hb A1C)  - glipiZIDE (GLUCOTROL XL) 10 MG extended release tablet; Take 1 tablet by mouth 2 times daily  Dispense: 180 tablet; Refill: 0  - SITagliptin (JANUVIA) 100 MG tablet; Take 1 tablet by mouth daily  Dispense: 90 tablet; Refill: 0  - CBC; Future  - Comprehensive Metabolic Panel; Future  - Lipid Panel; Future  - Hemoglobin A1C; Future    2. Family history of colon cancer  Strongly encouraged colonoscopy. Pt refuses. Has cologuard kit at home, but has not completed. Strongly encouraged to complete asap. Pt agrees.      3. Elevated PSA  - Psa screening; Future    4. Multiple pulmonary nodules  Has CT yearly in March. No change in recent CT. 5. Anxiety state  Stable. 6. Chronic pain of right ankle  Pt was previously referred to ortho, but they were unable to get a hold of him to schedule. Contact info given for ortho. Encouraged pt to call for apt. Return in about 4 weeks (around 1/4/2021) for AWV.      Electronically signed by JORDAN Mart CNP on 12/7/2020 at 7:28 PM

## 2020-12-08 LAB
A/G RATIO: 1.7 (ref 1.1–2.2)
ALBUMIN SERPL-MCNC: 4.5 G/DL (ref 3.4–5)
ALP BLD-CCNC: 97 U/L (ref 40–129)
ALT SERPL-CCNC: 15 U/L (ref 10–40)
ANION GAP SERPL CALCULATED.3IONS-SCNC: 12 MMOL/L (ref 3–16)
AST SERPL-CCNC: 19 U/L (ref 15–37)
BILIRUB SERPL-MCNC: 0.5 MG/DL (ref 0–1)
BUN BLDV-MCNC: 9 MG/DL (ref 7–20)
CALCIUM SERPL-MCNC: 9.2 MG/DL (ref 8.3–10.6)
CHLORIDE BLD-SCNC: 101 MMOL/L (ref 99–110)
CHOLESTEROL, TOTAL: 201 MG/DL (ref 0–199)
CO2: 24 MMOL/L (ref 21–32)
CREAT SERPL-MCNC: 0.9 MG/DL (ref 0.8–1.3)
ESTIMATED AVERAGE GLUCOSE: 180 MG/DL
GFR AFRICAN AMERICAN: >60
GFR NON-AFRICAN AMERICAN: >60
GLOBULIN: 2.7 G/DL
GLUCOSE BLD-MCNC: 169 MG/DL (ref 70–99)
HBA1C MFR BLD: 7.9 %
HDLC SERPL-MCNC: 38 MG/DL (ref 40–60)
LDL CHOLESTEROL CALCULATED: 141 MG/DL
POTASSIUM SERPL-SCNC: 4.4 MMOL/L (ref 3.5–5.1)
PROSTATE SPECIFIC ANTIGEN: 3.47 NG/ML (ref 0–4)
SODIUM BLD-SCNC: 137 MMOL/L (ref 136–145)
TOTAL PROTEIN: 7.2 G/DL (ref 6.4–8.2)
TRIGL SERPL-MCNC: 111 MG/DL (ref 0–150)
VLDLC SERPL CALC-MCNC: 22 MG/DL

## 2021-01-27 ENCOUNTER — TELEMEDICINE (OUTPATIENT)
Dept: PRIMARY CARE CLINIC | Age: 71
End: 2021-01-27
Payer: MEDICARE

## 2021-01-27 DIAGNOSIS — R91.8 MULTIPLE PULMONARY NODULES: ICD-10-CM

## 2021-01-27 DIAGNOSIS — E11.9 TYPE 2 DIABETES MELLITUS WITHOUT COMPLICATION, WITHOUT LONG-TERM CURRENT USE OF INSULIN (HCC): ICD-10-CM

## 2021-01-27 DIAGNOSIS — Z00.00 ROUTINE GENERAL MEDICAL EXAMINATION AT A HEALTH CARE FACILITY: Primary | ICD-10-CM

## 2021-01-27 DIAGNOSIS — Z28.21 INFLUENZA VACCINE REFUSED: ICD-10-CM

## 2021-01-27 DIAGNOSIS — Z80.0 FAMILY HISTORY OF COLON CANCER: Chronic | ICD-10-CM

## 2021-01-27 DIAGNOSIS — G89.29 CHRONIC PAIN OF LEFT KNEE: ICD-10-CM

## 2021-01-27 DIAGNOSIS — F41.1 ANXIETY STATE: ICD-10-CM

## 2021-01-27 DIAGNOSIS — M25.562 CHRONIC PAIN OF LEFT KNEE: ICD-10-CM

## 2021-01-27 PROCEDURE — 1123F ACP DISCUSS/DSCN MKR DOCD: CPT | Performed by: NURSE PRACTITIONER

## 2021-01-27 PROCEDURE — G8484 FLU IMMUNIZE NO ADMIN: HCPCS | Performed by: NURSE PRACTITIONER

## 2021-01-27 PROCEDURE — 3017F COLORECTAL CA SCREEN DOC REV: CPT | Performed by: NURSE PRACTITIONER

## 2021-01-27 PROCEDURE — 3046F HEMOGLOBIN A1C LEVEL >9.0%: CPT | Performed by: NURSE PRACTITIONER

## 2021-01-27 PROCEDURE — G0438 PPPS, INITIAL VISIT: HCPCS | Performed by: NURSE PRACTITIONER

## 2021-01-27 PROCEDURE — 4040F PNEUMOC VAC/ADMIN/RCVD: CPT | Performed by: NURSE PRACTITIONER

## 2021-01-27 RX ORDER — CITALOPRAM 20 MG/1
20 TABLET ORAL DAILY
Qty: 90 TABLET | Refills: 0 | Status: SHIPPED | OUTPATIENT
Start: 2021-01-27 | End: 2021-10-04

## 2021-01-27 RX ORDER — GLIPIZIDE 10 MG/1
10 TABLET, FILM COATED, EXTENDED RELEASE ORAL 2 TIMES DAILY
Qty: 180 TABLET | Refills: 0 | Status: SHIPPED | OUTPATIENT
Start: 2021-01-27 | End: 2021-07-01 | Stop reason: SDUPTHER

## 2021-01-27 ASSESSMENT — PATIENT HEALTH QUESTIONNAIRE - PHQ9
2. FEELING DOWN, DEPRESSED OR HOPELESS: 0
SUM OF ALL RESPONSES TO PHQ QUESTIONS 1-9: 0
2. FEELING DOWN, DEPRESSED OR HOPELESS: 0
SUM OF ALL RESPONSES TO PHQ QUESTIONS 1-9: 0
SUM OF ALL RESPONSES TO PHQ QUESTIONS 1-9: 0

## 2021-01-27 NOTE — PATIENT INSTRUCTIONS
Advance Directives: Care Instructions  Overview  An advance directive is a legal way to state your wishes at the end of your life. It tells your family and your doctor what to do if you can't say what you want. There are two main types of advance directives. You can change them any time your wishes change. Living will. This form tells your family and your doctor your wishes about life support and other treatment. The form is also called a declaration. Medical power of . This form lets you name a person to make treatment decisions for you when you can't speak for yourself. This person is called a health care agent (health care proxy, health care surrogate). The form is also called a durable power of  for health care. If you do not have an advance directive, decisions about your medical care may be made by a family member, or by a doctor or a  who doesn't know you. It may help to think of an advance directive as a gift to the people who care for you. If you have one, they won't have to make tough decisions by themselves. Follow-up care is a key part of your treatment and safety. Be sure to make and go to all appointments, and call your doctor if you are having problems. It's also a good idea to know your test results and keep a list of the medicines you take. What should you include in an advance directive? Many states have a unique advance directive form. (It may ask you to address specific issues.) Or you might use a universal form that's approved by many states. If your form doesn't tell you what to address, it may be hard to know what to include in your advance directive. Use the questions below to help you get started. · Who do you want to make decisions about your medical care if you are not able to? · What life-support measures do you want if you have a serious illness that gets worse over time or can't be cured? The kinds of foods you eat have a big impact on both your weight and your health. Reaching and staying at a healthy weight is not about going on a diet. It's about making healthier food choices every day and changing your diet for good. Healthy eating means eating a variety of foods so that you get all the nutrients you need. Your body needs protein, carbohydrate, and fats for energy. They keep your heart beating, your brain active, and your muscles working. On most days, try to eat from each food group. This means eating a variety of:  · Whole grains, such as whole wheat breads and pastas. · Fruits and vegetables. · Dairy products, such as low-fat milk, yogurt, and cheese. · Lean proteins, such as all types of fish, chicken without the skin, and beans. Don't have too much or too little of one thing. All foods, if eaten in moderation, can be part of healthy eating. Even sweets can be okay. If your favorite foods are high in fat, salt, sugar, or calories, limit how often you eat them. Eat smaller servings, or look for healthy substitutes. Do watch what you eat  Many people eat more than their bodies need. Part of staying at a healthy weight means learning how much food you really need from day to day and not eating more than that. Even with healthy foods, eating too much can make you gain weight. Having a well-balanced diet means that you eat enough, but not too much, and that your food gives you the nutrients you need to stay healthy. So listen to your body. Eat when you're hungry. Stop when you feel satisfied. It's a good idea to have healthy snacks ready for when you get hungry. Keep healthy snacks with you at work, in your car, and at home. If you have a healthy snack easily available, you'll be less likely to pick a candy bar or bag of chips from a vending machine instead. Some healthy snacks you might want to keep on hand are fruit, low-fat yogurt, string cheese, low-fat microwave popcorn, raisins and other dried fruit, nuts, whole wheat crackers, pretzels, carrots, celery sticks, and broccoli. Do some physical activity   A big part of reaching and staying at a healthy weight is being active. When you're active, you burn calories. This makes it easier to reach and stay at a healthy weight. When you're active on a regular basis, your body burns more calories, even when you're at rest. Being active helps you lose fat and build lean muscle. Try to be active for at least 1 hour every day. This may sound like a lot, but it's okay to be active in smaller blocks of time that add up to 1 hour a day. Any activity that makes your heart beat faster and keeps it there for a while counts. A brisk walk, run, or swim will get your heart beating faster. So will climbing stairs, shooting baskets, or cycling. Even some household chores like vacuuming and mowing the lawn will get your heart rate up. Pick activities that you enjoyones that make your heart beat faster, your muscles stronger, and your muscles and joints more flexible. If you find more than one thing you like doing, do them all. You don't have to do the same thing every day. Don't diet  Diets don't work. Diets are temporary. Because you give up so much when you diet, you may be hungry and think about food all the time. And after you stop dieting, you also may overeat to make up for what you missed. Most people who diet end up gaining back the pounds they lostand more. Remember that healthy bodies come in lots of shapes and sizes. Everyone can get healthier by eating better and being more active. Where can you learn more? Go to https://chencho.[employer]. org and sign in to your [employer] account. Enter 070 [id number] in the [employer] box to learn more about \"Learning About Healthy Weight. \" · You need 9989-2024 mg of calcium and 4840-5019 IU of vitamin D per day. It is possible to meet your calcium requirement with diet alone, but a vitamin D supplement is usually necessary to meet this goal.  · When exposed to the sun, use a sunscreen that protects against both UVA and UVB radiation with an SPF of 30 or greater. Reapply every 2 to 3 hours or after sweating, drying off with a towel, or swimming. · Always wear a seat belt when traveling in a car. Always wear a helmet when riding a bicycle or motorcycle. Patient Education        Preventing Falls: Care Instructions  Your Care Instructions     Getting around your home safely can be a challenge if you have injuries or health problems that make it easy for you to fall. Loose rugs and furniture in walkways are among the dangers for many older people who have problems walking or who have poor eyesight. People who have conditions such as arthritis, osteoporosis, or dementia also have to be careful not to fall. You can make your home safer with a few simple measures. Follow-up care is a key part of your treatment and safety. Be sure to make and go to all appointments, and call your doctor if you are having problems. It's also a good idea to know your test results and keep a list of the medicines you take. How can you care for yourself at home? Taking care of yourself  You may get dizzy if you do not drink enough water. To prevent dehydration, drink plenty of fluids, enough so that your urine is light yellow or clear like water. Choose water and other caffeine-free clear liquids. If you have kidney, heart, or liver disease and have to limit fluids, talk with your doctor before you increase the amount of fluids you drink. Exercise regularly to improve your strength, muscle tone, and balance. Walk if you can. Swimming may be a good choice if you cannot walk easily. Have your vision and hearing checked each year or any time you notice a change. If you have trouble seeing and hearing, you might not be able to avoid objects and could lose your balance. Know the side effects of the medicines you take. Ask your doctor or pharmacist whether the medicines you take can affect your balance. Sleeping pills or sedatives can affect your balance. Limit the amount of alcohol you drink. Alcohol can impair your balance and other senses. Ask your doctor whether calluses or corns on your feet need to be removed. If you wear loose-fitting shoes because of calluses or corns, you can lose your balance and fall. Talk to your doctor if you have numbness in your feet. Preventing falls at home  Remove raised doorway thresholds, throw rugs, and clutter. Repair loose carpet or raised areas in the floor. Move furniture and electrical cords to keep them out of walking paths. Use nonskid floor wax, and wipe up spills right away, especially on ceramic tile floors. If you use a walker or cane, put rubber tips on it. If you use crutches, clean the bottoms of them regularly with an abrasive pad, such as steel wool. Keep your house well lit, especially stairways, porches, and outside walkways. Use night-lights in areas such as hallways and bathrooms. Add extra light switches or use remote switches (such as switches that go on or off when you clap your hands) to make it easier to turn lights on if you have to get up during the night. Install sturdy handrails on stairways. Move items in your cabinets so that the things you use a lot are on the lower shelves (about waist level). Keep a cordless phone and a flashlight with new batteries by your bed. If possible, put a phone in each of the main rooms of your house, or carry a cell phone in case you fall and cannot reach a phone. Or, you can wear a device around your neck or wrist. You push a button that sends a signal for help.

## 2021-01-27 NOTE — PROGRESS NOTES
Accu-Chek FastClix Lancets MISC USE 1 TO CHECK GLUCOSE TWICE DAILY Yes Historical Provider, MD   blood glucose monitor strips 100 strips by Other route Test 2times a day & as needed for symptoms of irregular blood glucose. Whatever  Insurance  Covers  Diabetes e11.9 Yes Historical Provider, MD   Lancets Misc. MISC CHECK GLU BID AND PRN Yes JORDAN Del Valle CNP   blood glucose monitor strips Check glu BID and PRN Yes JORDAN Velez CNP   Blood Glucose Monitoring Suppl (BLOOD GLUCOSE MONITOR SYSTEM) w/Device KIT Check glu BID and PRN Whatever  Machine insurance  Covers Diabetes e11.9 Yes JORDAN Velez CNP         Past Medical History:   Diagnosis Date    Anxiety     Depression     Type 2 diabetes mellitus without complication ([facility] [address]        Past Surgical History:   Procedure Laterality Date    KNEE ARTHROSCOPY      NECK SURGERY         History reviewed. No pertinent family history. CareTeam (Including outside providers/suppliers regularly involved in providing care):   Patient Care Team:  JORDAN Parker CNP as PCP - General (Family Nurse Practitioner)  JORDAN Parker CNP as PCP - [facility] Empaneled Provider    Wt Readings from Last 3 Encounters:   12/07/20 231 lb 9.6 oz (105.1 kg)   09/08/20 230 lb 3.2 oz (104.4 kg)   06/01/20 220 lb (99.8 kg)     Patient-Reported Vitals 1/27/2021   Patient-Reported Weight 220   Patient-Reported Height 6 foot      There is no height or weight on file to calculate BMI. Based upon direct observation of the patient, evaluation of cognition reveals recent and remote memory intact. Physical Exam  Constitutional:       Appearance: Normal appearance. HENT:      Head: Normocephalic and atraumatic. Nose: Nose normal.      Mouth/Throat:      Mouth: Mucous membranes are moist.      Pharynx: Oropharynx is clear. Neck:      Musculoskeletal: Normal range of motion.    Pulmonary: Effort: Pulmonary effort is normal. No respiratory distress. Musculoskeletal: Normal range of motion. Skin:     Coloration: Skin is not pale. Findings: No rash. Neurological:      General: No focal deficit present. Mental Status: He is alert. Cranial Nerves: No cranial nerve deficit. Psychiatric:         Mood and Affect: Mood normal.         Behavior: Behavior normal.       Patient's complete Health Risk Assessment and screening values have been reviewed and are found in Flowsheets. The following problems were reviewed today and where indicated follow up appointments were made and/or referrals ordered. Positive Risk Factor Screenings with Interventions:     Fall Risk:  Timed Up and Go Test > 12 seconds? (Complete if either Fall Risk answers are Yes): (!) yes  2 or more falls in past year?: no  Fall with injury in past year?: no  Fall Risk Interventions:    · Home safety tips provided  · Patient declines any further evaluation/treatment for this issue          General Health and ACP:  General  In general, how would you say your health is?: Good  In the past 7 days, have you experienced any of the following?  New or Increased Pain, New or Increased Fatigue, Loneliness, Social Isolation, Stress or Anger?: None of These  Do you get the social and emotional support that you need?: Yes  Do you have a Living Will?: (!) No  Advance Directives     Power of [address] [place] [address] Will ACP-Advance Directive ACP-Power of     Not on File Not on File Not on File Not on File      General Health Risk Interventions:  · No Living Will: Patient declines ACP discussion/assistance    Health Habits/Nutrition:  Health Habits/Nutrition  Do you exercise for at least 20 minutes 2-3 times per week?: (!) No  Have you lost any weight without trying in the past 3 months?: No  Do you eat fewer than 2 meals per day?: No  Have you seen a dentist within the past year?: (!) No     Health Habits/Nutrition Interventions: · Inadequate physical activity:  educational materials provided to promote increased physical activity    Hearing/Vision:  No exam data present  Hearing/Vision  Do you or your family notice any trouble with your hearing?: (!) Yes  Do you have difficulty driving, watching TV, or doing any of your daily activities because of your eyesight?: No  Have you had an eye exam within the past year?: (!) No  Hearing/Vision Interventions:  · Hearing concerns:  patient declines any further evaluation/treatment for hearing issues    Safety:  Safety  Do you have working smoke detectors?: Yes  Have all throw rugs been removed or fastened?: (!) No  Do you have non-slip mats or surfaces in all bathtubs/showers?: (!) No  Do all of your stairways have a railing or banister?: Yes  Are your doorways, halls and stairs free of clutter?: Yes  Do you always fasten your seatbelt when you are in a car?: Yes  Safety Interventions:  · Patient declines any further evaluation/treatment for this issue     Personalized Preventive Plan   Current Health Maintenance Status  Immunization History   Administered Date(s) Administered    Tdap (Boostrix, Adacel) 05/05/2010        Health Maintenance   Topic Date Due    Colon cancer screen colonoscopy  09/23/2001    Annual Wellness Visit (AWV)  02/27/2020    DTaP/Tdap/Td vaccine (2 - Td) 05/05/2020    AAA screen  03/04/2021 (Originally 1950)    Shingles Vaccine (1 of 2) 06/01/2021 (Originally 9/23/2001)    Pneumococcal 65+ years Vaccine (1 of 1 - PPSV23) 06/02/2021 (Originally 9/23/2016)    Flu vaccine (1) 01/27/2022 (Originally 9/1/2020)    Diabetic foot exam  09/08/2021    Diabetic microalbuminuria test  09/08/2021    Diabetic retinal exam  09/09/2021    A1C test (Diabetic or Prediabetic)  12/07/2021    Lipid screen  12/07/2021    Hepatitis A vaccine  Aged Out    Hib vaccine  Aged Out    Meningococcal (ACWY) vaccine  Aged Out    Hepatitis C screen  Discontinued Recommendations for Preventive Services Due: see orders and patient instructions/AVS.  . Recommended screening schedule for the next 5-10 years is provided to the patient in written form: see Patient Brennan Palacios was seen today for annual exam.    Diagnoses and all orders for this visit:    Routine general medical examination at a health care facility  [address] [place] [address] HANDOUTS  ADVISED  [facility] [address]  REFUSES STATIN  REFUSES FLU, PNEUMONIA AND TETANUS VACCINES. AAA SCREEN COMPLETED THROUGH [facility]. PATIENT WILL BRING IN COPY OF RESULTS. STRONGLY ENCOURAGED PATIENT TO COMPLETE COLOGUARD KIT FOR COLON CA SCREENING. REFUSES CSCOPE    Influenza vaccine refused    Type 2 diabetes mellitus without complication, without long-term current use of insulin (HCC)  -     SITagliptin (JANUVIA) 100 MG tablet; Take 1 tablet by mouth daily  -     glipiZIDE (GLUCOTROL XL) 10 MG extended release tablet; Take 1 tablet by mouth 2 times daily    Anxiety state  -     citalopram (CELEXA) 20 MG tablet; Take 1 tablet by mouth daily    Chronic pain of left knee  CONTINUE WITH COPPER KNEE BRACE. IF NO IMPROVEMENT, WILL NEED ORTHO REFERRAL    Family history of colon cancer  STRONGLY ADVISED TO COMPLETE COLOGUARD KIT. Multiple pulmonary nodules  ANNUAL CT DUE IN [facility].  MONITORED BY PREVIOUS EMPLOYER. Annette Wing is a 72 y.o. male being evaluated by a Virtual Visit (video and audio) encounter to address concerns as mentioned above. A caregiver was present when appropriate. Due to this being a TeleHealth encounter (During CVXHK-11 public health emergency), evaluation of the following organ systems was limited: Vitals/Constitutional/EENT/Resp/CV/GI//MS/Neuro/Skin/Heme-Lymph-Imm. Pursuant to the emergency declaration under the [address] [place] [address], [address] [place] [address] and the [employer] and [employer] Act, this Virtual Visit was conducted with patient's (and/or legal guardian's) consent, to reduce the patient's risk of exposure to COVID-19 and provide necessary medical care. The patient (and/or legal guardian) has also been advised to contact this office for worsening conditions or problems, and seek emergency medical treatment and/or call 911 if deemed necessary. Patient identification was verified at the start of the visit: Yes    Services were provided through a video synchronous discussion virtually to substitute for in-person clinic visit. Patient and provider were located at their individual homes. --JORDAN Duong CNP on 1/27/2021 at 1:59 PM    An electronic signature was used to authenticate this note.

## 2021-03-08 ENCOUNTER — OFFICE VISIT (OUTPATIENT)
Dept: PRIMARY CARE CLINIC | Age: 71
End: 2021-03-08
Payer: MEDICARE

## 2021-03-08 VITALS
BODY MASS INDEX: 30.61 KG/M2 | HEART RATE: 83 BPM | OXYGEN SATURATION: 97 % | WEIGHT: 226 LBS | TEMPERATURE: 98.2 F | HEIGHT: 72 IN | SYSTOLIC BLOOD PRESSURE: 120 MMHG | DIASTOLIC BLOOD PRESSURE: 75 MMHG

## 2021-03-08 DIAGNOSIS — R97.20 ELEVATED PSA: ICD-10-CM

## 2021-03-08 DIAGNOSIS — Z23 IMMUNIZATION DUE: ICD-10-CM

## 2021-03-08 DIAGNOSIS — R13.12 OROPHARYNGEAL DYSPHAGIA: ICD-10-CM

## 2021-03-08 DIAGNOSIS — M25.562 CHRONIC PAIN OF LEFT KNEE: ICD-10-CM

## 2021-03-08 DIAGNOSIS — E11.9 TYPE 2 DIABETES MELLITUS WITHOUT COMPLICATION, WITHOUT LONG-TERM CURRENT USE OF INSULIN (HCC): Primary | ICD-10-CM

## 2021-03-08 DIAGNOSIS — E11.9 TYPE 2 DIABETES MELLITUS WITHOUT COMPLICATION, WITHOUT LONG-TERM CURRENT USE OF INSULIN (HCC): ICD-10-CM

## 2021-03-08 DIAGNOSIS — G89.29 CHRONIC PAIN OF LEFT KNEE: ICD-10-CM

## 2021-03-08 DIAGNOSIS — R91.8 MULTIPLE PULMONARY NODULES: ICD-10-CM

## 2021-03-08 DIAGNOSIS — Z80.0 FAMILY HISTORY OF COLON CANCER: Chronic | ICD-10-CM

## 2021-03-08 DIAGNOSIS — H91.90 HEARING LOSS, UNSPECIFIED HEARING LOSS TYPE, UNSPECIFIED LATERALITY: ICD-10-CM

## 2021-03-08 LAB
A/G RATIO: 1.4 (ref 1.1–2.2)
ALBUMIN SERPL-MCNC: 4.2 G/DL (ref 3.4–5)
ALP BLD-CCNC: 125 U/L (ref 40–129)
ALT SERPL-CCNC: 13 U/L (ref 10–40)
ANION GAP SERPL CALCULATED.3IONS-SCNC: 10 MMOL/L (ref 3–16)
AST SERPL-CCNC: 17 U/L (ref 15–37)
BILIRUB SERPL-MCNC: 0.3 MG/DL (ref 0–1)
BUN BLDV-MCNC: 12 MG/DL (ref 7–20)
CALCIUM SERPL-MCNC: 9.3 MG/DL (ref 8.3–10.6)
CHLORIDE BLD-SCNC: 98 MMOL/L (ref 99–110)
CO2: 25 MMOL/L (ref 21–32)
CREAT SERPL-MCNC: 1.1 MG/DL (ref 0.8–1.3)
GFR AFRICAN AMERICAN: >60
GFR NON-AFRICAN AMERICAN: >60
GLOBULIN: 3.1 G/DL
GLUCOSE BLD-MCNC: 203 MG/DL (ref 70–99)
HCT VFR BLD CALC: 47.3 % (ref 40.5–52.5)
HEMOGLOBIN: 16 G/DL (ref 13.5–17.5)
MCH RBC QN AUTO: 29.8 PG (ref 26–34)
MCHC RBC AUTO-ENTMCNC: 33.7 G/DL (ref 31–36)
MCV RBC AUTO: 88.3 FL (ref 80–100)
PDW BLD-RTO: 13.7 % (ref 12.4–15.4)
PLATELET # BLD: 243 K/UL (ref 135–450)
PMV BLD AUTO: 7.7 FL (ref 5–10.5)
POTASSIUM SERPL-SCNC: 4.5 MMOL/L (ref 3.5–5.1)
RBC # BLD: 5.36 M/UL (ref 4.2–5.9)
SODIUM BLD-SCNC: 133 MMOL/L (ref 136–145)
TOTAL PROTEIN: 7.3 G/DL (ref 6.4–8.2)
WBC # BLD: 8.9 K/UL (ref 4–11)

## 2021-03-08 PROCEDURE — 99214 OFFICE O/P EST MOD 30 MIN: CPT | Performed by: NURSE PRACTITIONER

## 2021-03-08 ASSESSMENT — ENCOUNTER SYMPTOMS
EYE ITCHING: 0
NAUSEA: 0
EYE PAIN: 0
ABDOMINAL PAIN: 0
WHEEZING: 0
COLOR CHANGE: 0
VOICE CHANGE: 0
SHORTNESS OF BREATH: 1
COUGH: 0
VOMITING: 0
DIARRHEA: 0
EYE DISCHARGE: 0
CHEST TIGHTNESS: 0
BACK PAIN: 0
TROUBLE SWALLOWING: 1
ABDOMINAL DISTENTION: 0
CONSTIPATION: 0
SORE THROAT: 0
BLOOD IN STOOL: 0

## 2021-03-08 NOTE — PROGRESS NOTES
ENCOUNTER DATE: 3/8/2021     NAME: Annette Wing   AGE: 72 y.o. GENDER: male   YOB: 1950    Patient Active Problem List   Diagnosis    Spinal stenosis, cervical region    Multiple pulmonary nodules    Type 2 diabetes mellitus without complication, without long-term current use of insulin (HCC)    Anxiety state    Chronic neck pain    Insomnia    Elevated PSA    Family history of colon cancer    Chronic pain of left knee      Allergies   Allergen Reactions    Sulfamethoxazole-Trimethoprim Nausea And Vomiting     Makes sick       Current Outpatient Medications on File Prior to Visit   Medication Sig Dispense Refill    glipiZIDE (GLUCOTROL XL) 10 MG extended release tablet Take 1 tablet by mouth 2 times daily 180 tablet 0    citalopram (CELEXA) 20 MG tablet Take 1 tablet by mouth daily 90 tablet 0    Accu-Chek FastClix Lancets MISC USE 1 TO CHECK GLUCOSE TWICE DAILY      blood glucose monitor strips 100 strips by Other route Test 2times a day & as needed for symptoms of irregular blood glucose. Whatever  Insurance  Covers  Diabetes e11.9      Lancets Misc. MISC CHECK GLU BID AND  each 1    blood glucose monitor strips Check glu BID and  strip 3    Blood Glucose Monitoring Suppl (BLOOD GLUCOSE MONITOR SYSTEM) w/Device KIT Check glu BID and PRN Whatever  Machine insurance  Covers Diabetes e11.9 1 kit 1     No current facility-administered medications on file prior to visit.          Social History     Tobacco Use    Smoking status: Former Smoker     Packs/day: 2.00     Years: 20.00     Pack years: 40.00     Types: Cigarettes    Smokeless tobacco: Never Used   Substance Use Topics    Alcohol use: Never     Frequency: Never      CARE TEAM  Patient Care Team:  JORDAN Duong CNP as PCP - General (Family Nurse Practitioner)  JORDAN Duong CNP as PCP - [facility] Empaneled Provider    Chief Complaint   Patient presents with    Blood Work     follow up labs medication        HPI:   Patient is here for a chronic visit. DMII:  Last A1C 7.9  States glu have been running high, around 200s. He was on statin in the past and caused too much joint pain so he stopped taking it  Not on ACEI. \"Doesn't want to take any more medication\".   Due for diabetic eye exam  Not taking januvia due to cost.   Taking glipizide 10mg daily. Admits his diet has been poor. MOOD:  On celexa for depression and seems to be doing well  Has some difficulty going to sleep. Has tried multiple medications and don't help. Seems to be doing a little better. HTN:  Controlled without medication. Refuses to take ACEI. PULM:  Has CT chest annually from previous employer. Recent CT 3/2/2020 and was stable    COLON CA SCREEN:  +FH colon cancer (twin brother). Had cscope about 10 yrs ago and was normal. Has refused any further colonoscopy. He received cologuard kit in the mail, but has not completed this    AAA screen:  Completed per [facility]. Has report at home. ORTHO:  Had a fall last month. Has been using a cane since. Left knee gave out on him. Having a lot of knee pain. Would like ortho referral. Had previous surgery on left knee many years ago. Has had a few arthroscopes on his knee. ENT:  Requests ENT referral. States he has trouble swallowing at times and will get choked. Feels like he \"swallows his tongue\" at times and blocks his airway and gets choked pretty bad. States he had to see ENT many years ago for similar problem. Has had several neck surgeries where they entered through his throat. Feels like may be related to his previous surgeries. Also having some hearing loss he would like evaluated. ROS:  Review of Systems   Constitutional: Negative for activity change, appetite change, chills, fatigue and unexpected weight change. HENT: Positive for trouble swallowing.  Negative for congestion, ear pain, hearing loss, nosebleeds, postnasal drip, sneezing, sore throat and voice change. Hearing loss   Eyes: Negative for pain, discharge and itching. Respiratory: Positive for shortness of breath (chronic). Negative for cough, chest tightness and wheezing. Pulmonary nodules monitored per annual CT per pulm   Cardiovascular: Negative for chest pain, palpitations and leg swelling. Gastrointestinal: Negative for abdominal distention, abdominal pain, blood in stool, constipation, diarrhea, nausea and vomiting. Endocrine: Negative for cold intolerance and heat intolerance. Genitourinary: Negative for difficulty urinating and dysuria. History of elevated PSA   Musculoskeletal: Positive for arthralgias, gait problem and neck pain (s/p multiple surgeries). Negative for back pain and neck stiffness. Skin: Negative for color change, pallor and rash. Neurological: Negative for dizziness, tremors, seizures, numbness and headaches. Hematological: Does not bruise/bleed easily. Psychiatric/Behavioral: Positive for sleep disturbance. Negative for agitation. The patient is not nervous/anxious. VITALS:  /75   Pulse 83   Temp 98.2 °F (36.8 °C) (Infrared)   Ht 6' (1.829 m)   Wt 226 lb (102.5 kg)   SpO2 97%   BMI 30.65 kg/m²      PE:  Physical Exam  Vitals signs and nursing note reviewed. Constitutional:       General: He is not in acute distress. Appearance: Normal appearance. He is well-developed and normal weight. He is not diaphoretic. HENT:      Head: Normocephalic and atraumatic. Nose: No congestion or rhinorrhea. Neck:      Musculoskeletal: Normal range of motion. No neck rigidity. Vascular: No carotid bruit. Cardiovascular:      Rate and Rhythm: Normal rate and regular rhythm. Heart sounds: Normal heart sounds. No murmur. No friction rub. Pulmonary:      Effort: Pulmonary effort is normal. No respiratory distress. Breath sounds: No wheezing, rhonchi or rales. Abdominal:      General: Abdomen is flat. for eye exam.     - CBC; Future  - Comprehensive Metabolic Panel; Future  - Hemoglobin A1C; Future    2. Elevated PSA  Stable. 3. Family history of colon cancer  Discussed with patient several times the importance of colon cancer screening. Patient refuses cscope and has had cologuard kit to complete at home for a year. Strongly encouraged to complete screening. Patient agrees to cologuard only. 4. Multiple pulmonary nodules  Annual CT due in march per specialists. States they contact him to schedule CT    5. Chronic pain of left knee  Refer to ortho for further eval/treatment of his left knee. Continue to ambulate with cane. - Robert Edge MD, Orthopedic Surgery, [facility]    6. Oropharyngeal dysphagia  Refer to ENT per patient request. Will call and schedule apt. - [facility]- ENT    7. Hearing loss, unspecified hearing loss type, unspecified laterality  Refer to ENT per patient request. Will call and schedule apt.     8. Immunization due  Refuses shingles vaccine  Refuses flu vacine. Refuses pneumonia vaccine. Would like tetanus vaccine. Advised he has to obtain this at his local pharmacy due to insurance coverage. Return in about 3 months (around 6/8/2021) for chronic visit.      Electronically signed by JORDAN Basilio CNP on 3/8/2021 at 2:42 PM

## 2021-03-09 LAB
ESTIMATED AVERAGE GLUCOSE: 214.5 MG/DL
HBA1C MFR BLD: 9.1 %

## 2021-03-11 ENCOUNTER — VIRTUAL VISIT (OUTPATIENT)
Dept: PRIMARY CARE CLINIC | Age: 71
End: 2021-03-11
Payer: MEDICARE

## 2021-03-11 DIAGNOSIS — E11.65 UNCONTROLLED TYPE 2 DIABETES MELLITUS WITH HYPERGLYCEMIA (HCC): Primary | ICD-10-CM

## 2021-03-11 PROCEDURE — 99213 OFFICE O/P EST LOW 20 MIN: CPT | Performed by: NURSE PRACTITIONER

## 2021-03-11 ASSESSMENT — ENCOUNTER SYMPTOMS
EYE ITCHING: 0
SORE THROAT: 0
ABDOMINAL DISTENTION: 0
EYE PAIN: 0
COLOR CHANGE: 0
TROUBLE SWALLOWING: 1
WHEEZING: 0
NAUSEA: 0
VOMITING: 0
VOICE CHANGE: 0
CONSTIPATION: 0
ABDOMINAL PAIN: 0
CHEST TIGHTNESS: 0
COUGH: 0
BACK PAIN: 0
EYE DISCHARGE: 0
BLOOD IN STOOL: 0
SHORTNESS OF BREATH: 1
DIARRHEA: 0

## 2021-03-11 NOTE — PROGRESS NOTES
3/11/2021        TELEHEALTH EVALUATION -- Audio/Visual (During LUEQJ-67 public health emergency)    HPI:    Matty Parra (:  1950) has requested an audio/video evaluation for the following concern(s):    Patient seen virtually to discuss recent lab results. a1c now 9.1. On glipizide XL 10mg BID. Didn't tolerate metformin. Made his legs hurt and tingle.   [facility] [place] was too expensive. He tolerated this, but quit taking it due to cost.   Admits his diet has been poor. Has been eating a lot of lemon cake and cereal, rice etc.   Does not want to start insulin. Can't fathom the thought of having to take a daily shot. He is motivated to make dietary changes. a1c 3/2020 was 6.7 and he was strict with his diet. Review of Systems   Constitutional: Negative for activity change, appetite change, chills, fatigue and unexpected weight change. HENT: Positive for trouble swallowing. Negative for congestion, ear pain, hearing loss, nosebleeds, postnasal drip, sneezing, sore throat and voice change. Hearing loss   Eyes: Negative for pain, discharge and itching. Respiratory: Positive for shortness of breath (chronic). Negative for cough, chest tightness and wheezing. Pulmonary nodules monitored per annual CT per pulm   Cardiovascular: Negative for chest pain, palpitations and leg swelling. Gastrointestinal: Negative for abdominal distention, abdominal pain, blood in stool, constipation, diarrhea, nausea and vomiting. Endocrine: Negative for cold intolerance and heat intolerance. Genitourinary: Negative for difficulty urinating and dysuria. History of elevated PSA   Musculoskeletal: Positive for arthralgias, gait problem and neck pain (s/p multiple surgeries). Negative for back pain and neck stiffness. Skin: Negative for color change, pallor and rash. Neurological: Negative for dizziness, tremors, seizures, numbness and headaches. Hematological: Does not bruise/bleed easily. Psychiatric/Behavioral: Positive for sleep disturbance. Negative for agitation. The patient is not nervous/anxious. Prior to Visit Medications    Medication Sig Taking? Authorizing Provider   dapagliflozin (FARXIGA) 5 MG tablet Take 1 tablet by mouth every morning Yes JORDAN Del Valle CNP   glipiZIDE (GLUCOTROL XL) 10 MG extended release tablet Take 1 tablet by mouth 2 times daily Yes JORDAN Del Valle CNP   Accu-Chek FastClix Lancets MISC USE 1 TO CHECK GLUCOSE TWICE DAILY Yes Historical Provider, MD   blood glucose monitor strips 100 strips by Other route Test 2times a day & as needed for symptoms of irregular blood glucose. Whatever  Insurance  Covers  Diabetes e11.9 Yes Historical Provider, MD   Lancets Misc.  MISC CHECK GLU BID AND PRN Yes JORDAN Del Valle CNP   blood glucose monitor strips Check glu BID and PRN Yes JORDAN Del Valle CNP   Blood Glucose Monitoring Suppl (BLOOD GLUCOSE MONITOR SYSTEM) w/Device KIT Check glu BID and PRN Whatever  Machine insurance  Covers Diabetes e11.9 Yes JORDAN Ashton CNP   citalopram (CELEXA) 20 MG tablet Take 1 tablet by mouth daily  Patient not taking: Reported on 3/11/2021  JORDAN Woo CNP       Social History     Tobacco Use    Smoking status: Former Smoker     Packs/day: 2.00     Years: 20.00     Pack years: 40.00     Types: Cigarettes    Smokeless tobacco: Never Used   Substance Use Topics    Alcohol use: Never     Frequency: Never    Drug use: Not on file        Allergies   Allergen Reactions    Sulfamethoxazole-Trimethoprim Nausea And Vomiting     Makes sick     ,   Past Medical History:   Diagnosis Date    Anxiety     Depression     Type 2 diabetes mellitus without complication ([facility] [address]    ,   Past Surgical History:   Procedure Laterality Date    KNEE ARTHROSCOPY      NECK SURGERY         PHYSICAL EXAMINATION:  [ INSTRUCTIONS:  \"[x]\" Indicates a positive item  \"[]\" Indicates a negative item  -- DELETE ALL ITEMS NOT EXAMINED]  Vital Signs: (As obtained by patient/caregiver or practitioner observation)    Blood pressure-  Heart rate-    Respiratory rate-    Temperature-  Pulse oximetry-     Constitutional: [x] Appears well-developed and well-nourished [x] No apparent distress      [] Abnormal-   Mental status  [x] Alert and awake  [x] Oriented to person/place/time [x]Able to follow commands      Eyes:  EOM    [x]  Normal  [] Abnormal-  Sclera  [x]  Normal  [] Abnormal -         Discharge [x]  None visible  [] Abnormal -    HENT:   [x] Normocephalic, atraumatic.   [] Abnormal   [x] Mouth/Throat: Mucous membranes are moist.     External Ears [x] Normal  [] Abnormal-     Neck: [x] No visualized mass     Pulmonary/Chest: [x] Respiratory effort normal.  [] No visualized signs of difficulty breathing or respiratory distress        [] Abnormal-      Musculoskeletal:   [x] Normal gait with no signs of ataxia         [x] Normal range of motion of neck        [] Abnormal-       Neurological:        [x] No Facial Asymmetry (Cranial nerve 7 motor function) (limited exam to video visit)          [x] No gaze palsy        [] Abnormal-         Skin:        [x] No significant exanthematous lesions or discoloration noted on facial skin         [] Abnormal-            Psychiatric:       [x] Normal Affect [x] No Hallucinations        [] Abnormal-     Other pertinent observable physical exam findings-     Lab Results   Component Value Date    LABA1C 9.1 03/08/2021     Lab Results   Component Value Date    .5 03/08/2021     Lab Results   Component Value Date    CHOL 201 (H) 12/07/2020    CHOL 222 (H) 09/08/2020     Lab Results   Component Value Date    TRIG 111 12/07/2020    TRIG 137 09/08/2020     Lab Results   Component Value Date    HDL 38 (L) 12/07/2020    HDL 46 09/08/2020     Lab Results   Component Value Date    LDLCALC 141 (H) 12/07/2020    LDLCALC 149 (H) 09/08/2020     Lab Results   Component Value Date    LABVLDL 22 12/07/2020    LABVLDL 27 09/08/2020     No results found for: [facility]   Lab Results   Component Value Date     (L) 03/08/2021    K 4.5 03/08/2021    CL 98 (L) 03/08/2021    CO2 25 03/08/2021    BUN 12 03/08/2021    CREATININE 1.1 03/08/2021    GLUCOSE 203 (H) 03/08/2021    CALCIUM 9.3 03/08/2021    PROT 7.3 03/08/2021    LABALBU 4.2 03/08/2021    BILITOT 0.3 03/08/2021    ALKPHOS 125 03/08/2021    AST 17 03/08/2021    ALT 13 03/08/2021    LABGLOM >60 03/08/2021    GFRAA >60 03/08/2021    AGRATIO 1.4 03/08/2021    GLOB 3.1 03/08/2021       ASSESSMENT/PLAN:  1. Uncontrolled type 2 diabetes mellitus with hyperglycemia (HCC)  DISCUSSED RECENT LAB RESULTS IN DETAIL. PATIENT REFUSES STATIN. HAS NOT TOLERATED IN THE PAST. DISCUSSED WORSENING DIABETES AND NOW UNCONTROLLED. DISCUSSED POSSIBLE COMPLICATIONS ASSOCIATED WITH UNCONTROLLED DMII. DISCUSSED MED OPTIONS. HE IS ADAMANT ABOUT NOT STARTING INSULIN. HE WOULD LIKE TO TRY ANOTHER ORAL MEDICATION. ADVISED MED MAY BE EXPENSIVE. WILL TRY FOR Sharon Bullion. ADVISED TO CALL IF TOO EXPENSIVE OR ANY SIDE EFFECTS. PATIENT IS MOTIVATED TO CHANGE DIET AND HAS GOOD UNDERSTANDING OF WATCHING CARB/SUGAR INTAKE AND READING FOOD LABELS. WILL RECHECK LABS IN 3MO. - dapagliflozin (FARXIGA) 5 MG tablet; Take 1 tablet by mouth every morning  Dispense: 30 tablet; Refill: 2      Return in about 3 months (around 6/11/2021) for chronic visit, Diabetes. Patricia Ford, was evaluated through a synchronous (real-time) audio-video encounter. The patient (or guardian if applicable) is aware that this is a billable service. Verbal consent to proceed has been obtained within the past 12 months. The visit was conducted pursuant to the emergency declaration under the [facility], [address] [place] [address] authority and the [employer] Act.   Patient identification was verified, and a caregiver was present when appropriate. The patient was located in a state where the provider was credentialed to provide care. Total time spent on this encounter: Not billed by time    --JORDAN Duong CNP on 3/11/2021 at 1:46 PM    An electronic signature was used to authenticate this note.

## 2021-03-16 ENCOUNTER — OFFICE VISIT (OUTPATIENT)
Dept: ORTHOPEDIC SURGERY | Age: 71
End: 2021-03-16
Payer: MEDICARE

## 2021-03-16 VITALS — WEIGHT: 225 LBS | TEMPERATURE: 96.4 F | HEIGHT: 72 IN | BODY MASS INDEX: 30.48 KG/M2

## 2021-03-16 DIAGNOSIS — M17.12 PRIMARY OSTEOARTHRITIS OF LEFT KNEE: ICD-10-CM

## 2021-03-16 DIAGNOSIS — M25.562 LEFT KNEE PAIN, UNSPECIFIED CHRONICITY: Primary | ICD-10-CM

## 2021-03-16 PROCEDURE — 20610 DRAIN/INJ JOINT/BURSA W/O US: CPT | Performed by: ORTHOPAEDIC SURGERY

## 2021-03-16 PROCEDURE — 99204 OFFICE O/P NEW MOD 45 MIN: CPT | Performed by: ORTHOPAEDIC SURGERY

## 2021-03-16 PROCEDURE — L1812 KO ELASTIC W/JOINTS PRE OTS: HCPCS | Performed by: ORTHOPAEDIC SURGERY

## 2021-03-16 RX ORDER — METHYLPREDNISOLONE ACETATE 40 MG/ML
40 INJECTION, SUSPENSION INTRA-ARTICULAR; INTRALESIONAL; INTRAMUSCULAR; SOFT TISSUE ONCE
Status: COMPLETED | OUTPATIENT
Start: 2021-03-16 | End: 2021-03-16

## 2021-03-16 RX ADMIN — METHYLPREDNISOLONE ACETATE 40 MG: 40 INJECTION, SUSPENSION INTRA-ARTICULAR; INTRALESIONAL; INTRAMUSCULAR; SOFT TISSUE at 16:33

## 2021-03-16 NOTE — PROGRESS NOTES
3/16/21 1:54 PM     Lidocaine Injection      NDC: [address]-31    Lot Number:[id number]    Body Part: left knee

## 2021-03-16 NOTE — PROGRESS NOTES
Date:  3/16/2021    Name:  Deyanira Rivera  Address:  [address] [address]    :  1950      Age:   72 y.o.    SSN:  [id number]      Medical Record Number:  [id number]    Reason for Visit:    Chief Complaint    Knee Pain (new patient left knee)      DOS:3/16/2021     HPI: Deyanira Rivera is a 72 y.o. male here today for evaluation of left knee pain and instability that has been going on since  associated with an injury. Patient states that he had a fall and ever since his knee is painful and feels as though it is going to give out. Pain has improved however instability has remained. This occasionally keeps him up at night. Denies any popping, catching or locking. Has positive surgical h/o major multi ligament knee reconstruction 27 years ago, had not had any issues until after his fall. Pain Assessment  Location of Pain: Knee  Location Modifiers: Left  Severity of Pain: 7  Quality of Pain: Dull, Aching  Duration of Pain: Persistent  Frequency of Pain: Constant  Aggravating Factors: Walking  Limiting Behavior: Yes  Relieving Factors: Rest  Result of Injury: Yes  Work-Related Injury: No  Are there other pain locations you wish to document?: No  ROS: Review of systems reviewed from Patient History Form completed today and available in the patient's chart under the Media tab. Past Medical History:   Diagnosis Date    Anxiety     Depression     Type 2 diabetes mellitus without complication ([facility] [address]         Past Surgical History:   Procedure Laterality Date    KNEE ARTHROSCOPY      NECK SURGERY         History reviewed. No pertinent family history.     Social History     Socioeconomic History    Marital status:      Spouse name: None    Number of children: None    Years of education: None    Highest education level: None   Occupational History    None   Social Needs    Financial resource strain: Not hard at all   [employer] insecurity     Worry: Never true     Inability: Never true  Transportation needs     Medical: No     Non-medical: No   Tobacco Use    Smoking status: Former Smoker     Packs/day: 2.00     Years: 20.00     Pack years: 40.00     Types: Cigarettes    Smokeless tobacco: Never Used   Substance and Sexual Activity    Alcohol use: Never     Frequency: Never    Drug use: None    Sexual activity: None   Lifestyle    Physical activity     Days per week: None     Minutes per session: None    Stress: None   Relationships    Social connections     Talks on phone: None     Gets together: None     Attends [employer] service: None     Active member of club or organization: None     Attends meetings of clubs or organizations: None     Relationship status: None    Intimate partner violence     Fear of current or ex partner: None     Emotionally abused: None     Physically abused: None     Forced sexual activity: None   Other Topics Concern    None   Social History Narrative    None       Current Outpatient Medications   Medication Sig Dispense Refill    dapagliflozin (FARXIGA) 5 MG tablet Take 1 tablet by mouth every morning 30 tablet 2    glipiZIDE (GLUCOTROL XL) 10 MG extended release tablet Take 1 tablet by mouth 2 times daily 180 tablet 0    citalopram (CELEXA) 20 MG tablet Take 1 tablet by mouth daily (Patient not taking: Reported on 3/11/2021) 90 tablet 0    Accu-Chek FastClix Lancets MISC USE 1 TO CHECK GLUCOSE TWICE DAILY      blood glucose monitor strips 100 strips by Other route Test 2times a day & as needed for symptoms of irregular blood glucose. Whatever  Insurance  Covers  Diabetes e11.9      Lancets Misc. MISC CHECK GLU BID AND  each 1    blood glucose monitor strips Check glu BID and  strip 3    Blood Glucose Monitoring Suppl (BLOOD GLUCOSE MONITOR SYSTEM) w/Device KIT Check glu BID and PRN Whatever  Machine insurance  Covers Diabetes e11.9 1 kit 1     No current facility-administered medications for this visit.         Allergies   Allergen Reactions    Sulfamethoxazole-Trimethoprim Nausea And Vomiting     Makes sick         Vital signs:  Temp 96.4 °F (35.8 °C)   Ht 6' (1.829 m)   Wt 225 lb (102.1 kg)   BMI 30.52 kg/m²        Left knee exam    Gait: No use of assistive devices. No antalgic gait. Use of a sleeve    Alignment: normal alignment. Inspection/skin: Skin is intact without erythema or ecchymosis. No gross deformity. Surgical scars appreciated    Palpation: mild crepitus. Medial joint line tenderness, posterior tenderness    Range of Motion: There is full range of motion. Strength: Normal quadriceps development. Effusion: No effusion or swelling present. Ligamentous stability: No cruciate or collateral ligament instability. Neurologic and vascular: Skin is warm and well-perfused. Sensation is intact to light-touch. Special tests: Negative Real sign. Right knee exam    Gait: No use of assistive devices. No antalgic gait. Alignment: normal alignment. Inspection/skin: Skin is intact without erythema or ecchymosis. No gross deformity. Palpation: mild crepitus. no joint line tenderness present. Range of Motion: There is full range of motion. Strength: Normal quadriceps development. Effusion: No effusion or swelling present. Ligamentous stability: No cruciate or collateral ligament instability. Neurologic and vascular: Skin is warm and well-perfused. Sensation is intact to light-touch. Special tests: Negative Real sign. Diagnostics:  Radiology:       Pertinent imaging was obtained, interpreted, and reviewed with the patient today, images only - no report available.     Radiographs were obtained and reviewed in the office; 4 views: bilateral PA, bilateral Wylie, bilateral [place] AND left lateral    Impression: left medial compartment narrowing    Office Procedures:  Orders Placed This Encounter   Procedures    XR KNEE LEFT (MIN 4 VIEWS)     Standing Status: described to the patient and questions were answered. Patient's exam is very reassuring as his knee is very stable. Patient does have osteoarthritis on the medial compartment of his knee. I am concerned about his subjective instability. At this time I believe he is a candidate for a left knee  brace. He would like to proceed with this. To address his pain today I believe he is a candidate for a cortisone injection. Will refer him to formal supervised physical therapy. If no improvement we may elect for an MRI looking for a loose body. I will see him back if no improvement or worsening symptoms  Annettekingston Amosshilo is in agreement with this plan. All questions were answered to patient's satisfaction and was encouraged to call with any further questions. Total time spent for evaluation, education, and development of treatment plan: 45 minutes    The indications and risks of steroid injection as well as treatment alternatives were discussed with the patient who consented to the procedure. Under sterile conditions and after informed consent was obtained the patient was given an injection into the left knee. 2cc 40 mg of Depo-Medrol and 4 mL of 1% lidocaine were placed in the knee after it was prepped with chlorhexidine. This resulted in good relief of symptoms. There were no complications. The patient was advised to ice the knee this evening and to avoid vigorous activities for the next 2 days. They were advised to call us if there was any erythema, enduration, swelling or increasing pain. Jb Last [address]  3/16/2021    During this exam, IJb PA-C, functioned as a scribe for Dr. Norbert Patel. The history taking and physical examination were performed by Dr. Norbert Patel. All counseling during the appointment was performed between the patient and Dr. Norbert Patel.  3/16/2021 4:48 PM    This dictation was performed with a verbal recognition program (DRAGON) and it was checked for errors. It is possible that there are still dictated errors within this office note. If so, please bring any areas to my attention for an addendum. All efforts were made to ensure that this office note is accurate. I attest that I met personally with the patient, performed the described exam, reviewed the radiographic studies and medical records associated with this patient and supervised the services that are described above.      Yun Hood MD

## 2021-04-30 ENCOUNTER — OFFICE VISIT (OUTPATIENT)
Dept: ENT CLINIC | Age: 71
End: 2021-04-30
Payer: MEDICARE

## 2021-04-30 VITALS
SYSTOLIC BLOOD PRESSURE: 130 MMHG | BODY MASS INDEX: 30.48 KG/M2 | HEART RATE: 86 BPM | HEIGHT: 72 IN | TEMPERATURE: 98 F | DIASTOLIC BLOOD PRESSURE: 80 MMHG | WEIGHT: 225 LBS

## 2021-04-30 DIAGNOSIS — R13.12 OROPHARYNGEAL DYSPHAGIA: Primary | ICD-10-CM

## 2021-04-30 DIAGNOSIS — K21.9 LARYNGOPHARYNGEAL REFLUX (LPR): ICD-10-CM

## 2021-04-30 DIAGNOSIS — J30.9 ALLERGIC RHINITIS, UNSPECIFIED SEASONALITY, UNSPECIFIED TRIGGER: ICD-10-CM

## 2021-04-30 DIAGNOSIS — H91.90 PERCEIVED HEARING LOSS: ICD-10-CM

## 2021-04-30 PROCEDURE — 31575 DIAGNOSTIC LARYNGOSCOPY: CPT | Performed by: STUDENT IN AN ORGANIZED HEALTH CARE EDUCATION/TRAINING PROGRAM

## 2021-04-30 PROCEDURE — 99204 OFFICE O/P NEW MOD 45 MIN: CPT | Performed by: STUDENT IN AN ORGANIZED HEALTH CARE EDUCATION/TRAINING PROGRAM

## 2021-04-30 RX ORDER — OMEPRAZOLE 40 MG/1
40 CAPSULE, DELAYED RELEASE ORAL
Qty: 90 CAPSULE | Refills: 1 | Status: SHIPPED | OUTPATIENT
Start: 2021-04-30 | End: 2021-07-01
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
## 2021-05-06 ENCOUNTER — HOSPITAL ENCOUNTER (OUTPATIENT)
Dept: GENERAL RADIOLOGY | Age: 71
Discharge: HOME | End: 2021-05-06
Payer: MEDICARE

## 2021-05-06 DIAGNOSIS — R13.12 OROPHARYNGEAL DYSPHAGIA: ICD-10-CM

## 2021-05-06 PROCEDURE — 74230 X-RAY XM SWLNG FUNCJ C+: CPT

## 2021-05-06 PROCEDURE — 74220 X-RAY XM ESOPHAGUS 1CNTRST: CPT

## 2021-06-30 NOTE — PROGRESS NOTES
ENCOUNTER DATE: 7/1/2021     NAME: Shaina Christina   AGE: 72 y.o. GENDER: male   YOB: 1950    Patient Active Problem List   Diagnosis    Spinal stenosis, cervical region    Multiple pulmonary nodules    Type 2 diabetes mellitus without complication, without long-term current use of insulin (HCC)    Anxiety state    Chronic neck pain    Insomnia    Elevated PSA    Family history of colon cancer    Chronic pain of left knee    Uncontrolled type 2 diabetes mellitus with hyperglycemia (HCC)    Oropharyngeal dysphagia    Chronic bilateral low back pain      Allergies   Allergen Reactions    Sulfamethoxazole-Trimethoprim Nausea And Vomiting     Makes sick       Current Outpatient Medications on File Prior to Visit   Medication Sig Dispense Refill    Accu-Chek FastClix Lancets MISC USE 1 TO CHECK GLUCOSE TWICE DAILY      blood glucose monitor strips 100 strips by Other route Test 2times a day & as needed for symptoms of irregular blood glucose. Whatever  Insurance  Covers  Diabetes e11.9      Lancets Misc. MISC CHECK GLU BID AND  each 1    blood glucose monitor strips Check glu BID and  strip 3    Blood Glucose Monitoring Suppl (BLOOD GLUCOSE MONITOR SYSTEM) w/Device KIT Check glu BID and PRN Whatever  Machine insurance  Covers Diabetes e11.9 1 kit 1    citalopram (CELEXA) 20 MG tablet Take 1 tablet by mouth daily (Patient not taking: Reported on 7/1/2021) 90 tablet 0     No current facility-administered medications on file prior to visit.         Social History     Tobacco Use    Smoking status: Former Smoker     Packs/day: 2.00     Years: 20.00     Pack years: 40.00     Types: Cigarettes    Smokeless tobacco: Never Used   Substance Use Topics    Alcohol use: Never      CARE TEAM  Patient Care Team:  JORDAN Acevedo CNP as PCP - General (Family Nurse Practitioner)  JORDAN Acevedo CNP as PCP - [facility] Empaneled Provider    Chief Complaint   Patient presents Has refused any further colonoscopy. He received cologuard kit in the mail, completed this yesterday. TEENA  Has been getting outbreaks on buttoks and abd. Wants cream.     ROS:  Review of Systems   Constitutional: Negative for activity change, appetite change, chills, fatigue and unexpected weight change. HENT: Positive for trouble swallowing. Negative for congestion, ear pain, hearing loss, nosebleeds, postnasal drip, sneezing, sore throat and voice change. Hearing loss   Respiratory: Positive for shortness of breath (chronic). Negative for cough, chest tightness and wheezing. Pulmonary nodules monitored per annual CT per pulm   Cardiovascular: Negative for chest pain, palpitations and leg swelling. Gastrointestinal: Negative for abdominal distention, abdominal pain, blood in stool, constipation, diarrhea, nausea and vomiting. Genitourinary: Negative for difficulty urinating and dysuria. History of elevated PSA   Musculoskeletal: Positive for arthralgias (chronic neck, back and left knee pain), back pain, gait problem and neck pain (s/p multiple surgeries). Negative for neck stiffness. Skin: Negative for color change, pallor and rash. Neurological: Negative for dizziness, tremors, seizures, numbness and headaches. Hematological: Does not bruise/bleed easily. Psychiatric/Behavioral: Positive for sleep disturbance. Negative for agitation. The patient is not nervous/anxious. VITALS:  /76 (Site: Right Upper Arm, Position: Sitting, Cuff Size: Large Adult)   Pulse 106   Ht 6' (1.829 m)   Wt 224 lb (101.6 kg)   SpO2 96%   BMI 30.38 kg/m²    Wt Readings from Last 3 Encounters:   07/01/21 224 lb (101.6 kg)   04/30/21 225 lb (102.1 kg)   03/16/21 225 lb (102.1 kg)     PE:  Physical Exam  Vitals and nursing note reviewed. Constitutional:       General: He is not in acute distress. Appearance: Normal appearance. He is well-developed and normal weight.  He is not diaphoretic. Comments: Appears to be in pain   HENT:      Head: Normocephalic and atraumatic. Neck:      Vascular: No carotid bruit. Cardiovascular:      Rate and Rhythm: Normal rate and regular rhythm. Heart sounds: Normal heart sounds. No murmur heard. No friction rub. Pulmonary:      Effort: Pulmonary effort is normal. No respiratory distress. Breath sounds: No wheezing, rhonchi or rales. Abdominal:      General: Abdomen is flat. There is no distension. Palpations: Abdomen is soft. Musculoskeletal:      Cervical back: Normal range of motion. Lumbar back: Spasms and tenderness present. No swelling, edema or bony tenderness. Decreased range of motion (unable to stand fully erect due to pain). Right lower leg: No edema. Left lower leg: No edema. Lymphadenopathy:      Cervical: No cervical adenopathy. Skin:     General: Skin is warm and dry. Coloration: Skin is not pale. Findings: No erythema or rash. Comments: Small, old healing abscess on right lower abdomen   Neurological:      General: No focal deficit present. Mental Status: He is alert and oriented to person, place, and time. Gait: Gait abnormal (ambulating with cane). Psychiatric:         Mood and Affect: Mood normal.         Behavior: Behavior normal.      Comments: Agitated today, likely from pain       Results for POC orders placed in visit on 07/01/21   POCT glycosylated hemoglobin (Hb A1C)   Result Value Ref Range    Hemoglobin A1C 8.0 %     ASSESSMENT/PLAN:  1. Uncontrolled type 2 diabetes mellitus with hyperglycemia (HCC)  a1c down to 8.0. Has not tolerated metformin, farxiga and [place] was too expensive. He does not want to start a new medication at this time. He will continue to work on dietary changes. - POCT glycosylated hemoglobin (Hb A1C)  - glipiZIDE (GLUCOTROL XL) 10 MG extended release tablet;  Take 1 tablet by mouth 2 times daily  Dispense: 180 tablet;

## 2021-07-01 ENCOUNTER — OFFICE VISIT (OUTPATIENT)
Dept: PRIMARY CARE CLINIC | Age: 71
End: 2021-07-01
Payer: MEDICARE

## 2021-07-01 VITALS
WEIGHT: 224 LBS | HEIGHT: 72 IN | BODY MASS INDEX: 30.34 KG/M2 | OXYGEN SATURATION: 96 % | DIASTOLIC BLOOD PRESSURE: 76 MMHG | HEART RATE: 106 BPM | SYSTOLIC BLOOD PRESSURE: 124 MMHG

## 2021-07-01 DIAGNOSIS — G89.29 CHRONIC PAIN OF LEFT KNEE: ICD-10-CM

## 2021-07-01 DIAGNOSIS — M54.40 BACK PAIN OF LUMBAR REGION WITH SCIATICA: ICD-10-CM

## 2021-07-01 DIAGNOSIS — Z12.11 SCREENING FOR COLON CANCER: ICD-10-CM

## 2021-07-01 DIAGNOSIS — M54.2 CHRONIC NECK PAIN: ICD-10-CM

## 2021-07-01 DIAGNOSIS — M25.562 CHRONIC PAIN OF LEFT KNEE: ICD-10-CM

## 2021-07-01 DIAGNOSIS — Z80.0 FAMILY HISTORY OF COLON CANCER: ICD-10-CM

## 2021-07-01 DIAGNOSIS — E11.65 UNCONTROLLED TYPE 2 DIABETES MELLITUS WITH HYPERGLYCEMIA (HCC): Primary | ICD-10-CM

## 2021-07-01 DIAGNOSIS — G89.29 CHRONIC NECK PAIN: ICD-10-CM

## 2021-07-01 DIAGNOSIS — L02.91 ABSCESS: ICD-10-CM

## 2021-07-01 DIAGNOSIS — R13.12 OROPHARYNGEAL DYSPHAGIA: ICD-10-CM

## 2021-07-01 DIAGNOSIS — M54.50 CHRONIC BILATERAL LOW BACK PAIN, UNSPECIFIED WHETHER SCIATICA PRESENT: ICD-10-CM

## 2021-07-01 DIAGNOSIS — G89.29 CHRONIC BILATERAL LOW BACK PAIN, UNSPECIFIED WHETHER SCIATICA PRESENT: ICD-10-CM

## 2021-07-01 LAB — HBA1C MFR BLD: 8 %

## 2021-07-01 PROCEDURE — G8427 DOCREV CUR MEDS BY ELIG CLIN: HCPCS | Performed by: NURSE PRACTITIONER

## 2021-07-01 PROCEDURE — 3017F COLORECTAL CA SCREEN DOC REV: CPT | Performed by: NURSE PRACTITIONER

## 2021-07-01 PROCEDURE — 1123F ACP DISCUSS/DSCN MKR DOCD: CPT | Performed by: NURSE PRACTITIONER

## 2021-07-01 PROCEDURE — 4040F PNEUMOC VAC/ADMIN/RCVD: CPT | Performed by: NURSE PRACTITIONER

## 2021-07-01 PROCEDURE — 3052F HG A1C>EQUAL 8.0%<EQUAL 9.0%: CPT | Performed by: NURSE PRACTITIONER

## 2021-07-01 PROCEDURE — G8417 CALC BMI ABV UP PARAM F/U: HCPCS | Performed by: NURSE PRACTITIONER

## 2021-07-01 PROCEDURE — 83036 HEMOGLOBIN GLYCOSYLATED A1C: CPT | Performed by: NURSE PRACTITIONER

## 2021-07-01 PROCEDURE — 1036F TOBACCO NON-USER: CPT | Performed by: NURSE PRACTITIONER

## 2021-07-01 PROCEDURE — 99214 OFFICE O/P EST MOD 30 MIN: CPT | Performed by: NURSE PRACTITIONER

## 2021-07-01 PROCEDURE — 2022F DILAT RTA XM EVC RTNOPTHY: CPT | Performed by: NURSE PRACTITIONER

## 2021-07-01 RX ORDER — TIZANIDINE 2 MG/1
2 TABLET ORAL NIGHTLY PRN
Qty: 30 TABLET | Refills: 0 | Status: SHIPPED | OUTPATIENT
Start: 2021-07-01 | End: 2021-07-26

## 2021-07-01 RX ORDER — OMEPRAZOLE 40 MG/1
40 CAPSULE, DELAYED RELEASE ORAL
Qty: 90 CAPSULE | Refills: 1 | Status: SHIPPED | OUTPATIENT
Start: 2021-07-01

## 2021-07-01 RX ORDER — HYDROCODONE BITARTRATE AND ACETAMINOPHEN 5; 325 MG/1; MG/1
1 TABLET ORAL EVERY 4 HOURS PRN
Qty: 30 TABLET | Refills: 0 | Status: SHIPPED | OUTPATIENT
Start: 2021-07-01 | End: 2021-07-06

## 2021-07-01 RX ORDER — GLIPIZIDE 10 MG/1
10 TABLET, FILM COATED, EXTENDED RELEASE ORAL 2 TIMES DAILY
Qty: 180 TABLET | Refills: 1 | Status: SHIPPED | OUTPATIENT
Start: 2021-07-01 | End: 2022-02-08 | Stop reason: SDUPTHER

## 2021-07-01 SDOH — ECONOMIC STABILITY: FOOD INSECURITY: WITHIN THE PAST 12 MONTHS, YOU WORRIED THAT YOUR FOOD WOULD RUN OUT BEFORE YOU GOT MONEY TO BUY MORE.: NEVER TRUE

## 2021-07-01 SDOH — ECONOMIC STABILITY: FOOD INSECURITY: WITHIN THE PAST 12 MONTHS, THE FOOD YOU BOUGHT JUST DIDN'T LAST AND YOU DIDN'T HAVE MONEY TO GET MORE.: NEVER TRUE

## 2021-07-01 ASSESSMENT — ENCOUNTER SYMPTOMS
VOICE CHANGE: 0
CHEST TIGHTNESS: 0
VOMITING: 0
ABDOMINAL DISTENTION: 0
BACK PAIN: 1
COUGH: 0
ABDOMINAL PAIN: 0
WHEEZING: 0
DIARRHEA: 0
BLOOD IN STOOL: 0
NAUSEA: 0
TROUBLE SWALLOWING: 1
SHORTNESS OF BREATH: 1
CONSTIPATION: 0
SORE THROAT: 0
COLOR CHANGE: 0

## 2021-07-01 ASSESSMENT — SOCIAL DETERMINANTS OF HEALTH (SDOH): HOW HARD IS IT FOR YOU TO PAY FOR THE VERY BASICS LIKE FOOD, HOUSING, MEDICAL CARE, AND HEATING?: NOT HARD AT ALL

## 2021-07-01 NOTE — PATIENT INSTRUCTIONS
Continue to focus on diet and glucose control  A1C today 8.0    Refer to new ortho for knee pain. Call for apt. Refer to pain management for further eval. Call for apt. Start on omeprazole daily as recommended per ENT. Zanaflex and Vicodin rx sent for short term pain relief. I can not continue to prescribe controlled medications.

## 2021-07-09 ENCOUNTER — TELEPHONE (OUTPATIENT)
Dept: PRIMARY CARE CLINIC | Age: 71
End: 2021-07-09

## 2021-09-07 ENCOUNTER — TELEPHONE (OUTPATIENT)
Dept: PRIMARY CARE CLINIC | Age: 71
End: 2021-09-07

## 2021-09-07 NOTE — TELEPHONE ENCOUNTER
----- Message from Christinaalpa Bernard sent at 9/7/2021  4:18 PM EDT -----  Subject: Message to Provider    QUESTIONS  Information for Provider? Patient is calling regarding handicap sticker . Patient has misplaced original copies provided to him. Patient is   requesting new paperwork to received handicap sticker  ---------------------------------------------------------------------------  --------------  CALL BACK INFO  What is the best way for the office to contact you? OK to leave message on   voicemail  Preferred Call Back Phone Number? [phone]  ---------------------------------------------------------------------------  --------------  SCRIPT ANSWERS  Relationship to Patient?  Self
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
## 2021-10-03 NOTE — PROGRESS NOTES
ENCOUNTER DATE: 10/4/2021     NAME: Tatyana Wang   AGE: 80 y.o. GENDER: male   YOB: 1950    Patient Active Problem List   Diagnosis    Spinal stenosis, cervical region    Multiple pulmonary nodules    Type 2 diabetes mellitus without complication, without long-term current use of insulin (HCC)    Anxiety state    Chronic neck pain    Insomnia    Elevated PSA    Family history of colon cancer    Chronic pain of left knee    Uncontrolled type 2 diabetes mellitus with hyperglycemia (HCC)    Oropharyngeal dysphagia    Chronic bilateral low back pain    Abscess      Allergies   Allergen Reactions    Sulfamethoxazole-Trimethoprim Nausea And Vomiting     Makes sick       Current Outpatient Medications on File Prior to Visit   Medication Sig Dispense Refill    omeprazole (PRILOSEC) 40 MG delayed release capsule Take 1 capsule by mouth every morning (before breakfast) 90 capsule 1    glipiZIDE (GLUCOTROL XL) 10 MG extended release tablet Take 1 tablet by mouth 2 times daily 180 tablet 1    Accu-Chek FastClix Lancets MISC USE 1 TO CHECK GLUCOSE TWICE DAILY      blood glucose monitor strips 100 strips by Other route Test 2times a day & as needed for symptoms of irregular blood glucose. Whatever  Insurance  Covers  Diabetes e11.9      Lancets Misc. MISC CHECK GLU BID AND  each 1    blood glucose monitor strips Check glu BID and  strip 3    Blood Glucose Monitoring Suppl (BLOOD GLUCOSE MONITOR SYSTEM) w/Device KIT Check glu BID and PRN Whatever  Machine insurance  Covers Diabetes e11.9 1 kit 1     No current facility-administered medications on file prior to visit.         Social History     Tobacco Use    Smoking status: Former Smoker     Packs/day: 2.00     Years: 20.00     Pack years: 40.00     Types: Cigarettes    Smokeless tobacco: Never Used   Substance Use Topics    Alcohol use: Never      CARE TEAM  Patient Care Team:  JORDAN Lovett CNP as PCP - General (Family Nurse Practitioner)  JORDAN Batista - CNP as PCP - [facility] Empaneled Provider    Chief Complaint   Patient presents with    Diabetes    Anxiety      HPI:   Patient is here for a chronic visit. DMII:  Last a1c 8.0  On glipizide 10mg BID  Doesn't check glu often. Has tried to cut out sweets. Admits diet isn't great. Hasn't tolerated metformin, farxiga. Festus Mulch was too expensive. Refuses statin and ACEI    MOOD:  No longer taking celexa. Doing better now. Denies depression. Feels stable without medication. ORTHO:  Referred to new ortho for knee pain. Never called for apt. Referred to PM for chronic neck, knee and back pain. Never called for apt. Has had neck fractures years ago. Has chronic back, knee and shoulder pain. Uses cane prn. He was given 1 rx for hydrocodone and took it only occasionally when his neck, shoulder and back pain were severe. He is asking for a new rx for hydrocodone, but states he doesn't want to take a drug test. States he does use marijuana occasionally and \"will ask friends for a pain pill every now and then. \"    GI:  Saw ENT for dysphagia  Previously prescribed PPI. He is now taking this. COLON CA SCREEN:  +FH colon cancer (twin brother). Had cscope about 10 yrs ago and was normal. Has refused any further colonoscopy  Has received new cologuard kit in the mail. The previous one he submitted was . HM:  Due for pneumonia, shingles, flu and covid vaccines. Due for AAA screening    ROS:  Review of Systems   Constitutional: Negative for activity change, appetite change, chills, fatigue and unexpected weight change. HENT: Positive for trouble swallowing. Negative for congestion, ear pain, hearing loss, nosebleeds, postnasal drip, sneezing, sore throat and voice change. Hearing loss   Respiratory: Positive for shortness of breath (chronic). Negative for cough, chest tightness and wheezing.          Pulmonary nodules monitored per annual CT per pulm   Cardiovascular: Negative for chest pain, palpitations and leg swelling. Gastrointestinal: Negative for abdominal distention, abdominal pain, blood in stool, constipation, diarrhea, nausea and vomiting. Genitourinary: Negative for difficulty urinating and dysuria. History of elevated PSA   Musculoskeletal: Positive for arthralgias (chronic neck, back and left knee pain), back pain (chronic), gait problem and neck pain (s/p multiple surgeries). Negative for neck stiffness. Skin: Negative for color change, pallor and rash. Neurological: Negative for dizziness, tremors, seizures, numbness and headaches. Hematological: Does not bruise/bleed easily. Psychiatric/Behavioral: Positive for sleep disturbance. Negative for agitation and dysphoric mood. The patient is not nervous/anxious. VITALS:  /84   Pulse 100   Temp 98 °F (36.7 °C) (Oral)   Ht 6' (1.829 m)   Wt 222 lb (100.7 kg)   SpO2 97%   BMI 30.11 kg/m²      PE:  Physical Exam  Vitals and nursing note reviewed. Constitutional:       General: He is not in acute distress. Appearance: Normal appearance. He is well-developed and normal weight. He is not diaphoretic. HENT:      Head: Normocephalic and atraumatic. Neck:      Vascular: No carotid bruit. Cardiovascular:      Rate and Rhythm: Normal rate and regular rhythm. Heart sounds: Normal heart sounds. No murmur heard. No friction rub. Pulmonary:      Effort: Pulmonary effort is normal. No respiratory distress. Breath sounds: No wheezing, rhonchi or rales. Abdominal:      General: Abdomen is flat. There is no distension. Palpations: Abdomen is soft. Musculoskeletal:      Right lower leg: No edema. Left lower leg: No edema. Lymphadenopathy:      Cervical: No cervical adenopathy. Skin:     General: Skin is warm and dry. Coloration: Skin is not pale. Findings: No erythema or rash.    Neurological:      General: No focal deficit present. Mental Status: He is alert and oriented to person, place, and time. Gait: Gait normal.   Psychiatric:         Mood and Affect: Mood normal.         Behavior: Behavior normal.       Results for POC orders placed in visit on 10/04/21   POCT glycosylated hemoglobin (Hb A1C)   Result Value Ref Range    Hemoglobin A1C 8.8 %       ASSESSMENT/PLAN:  1. Uncontrolled type 2 diabetes mellitus with hyperglycemia (HCC)  A1C 8.8. discussed with patient. Does not want to start any additional medication. States he will work on his diet. If no improvement, may need to start insulin.   - POCT glycosylated hemoglobin (Hb A1C)  - CBC; Future  - Comprehensive Metabolic Panel; Future  - Microalbumin / Creatinine Urine Ratio; Future    2. Oropharyngeal dysphagia  Saw ENT. Continue on PPI    3. Chronic neck pain  Discussed chronic pain medication. Patient requesting rx for hydrocodone. I advised I will not rx chronic pain medication. He has been referred to PM. Encouraged to call for apt. Patient states he uses Marijuana occasionally as well as obtains pain medication from friends at times. Advised I will not rx controlled meds under these circumstances. He will continue with zanaflex at night. - tiZANidine (ZANAFLEX) 2 MG tablet; TAKE 1 TABLET BY MOUTH NIGHTLY AS NEEDED FOR  MUSCLE  SPASMS  Dispense: 30 tablet; Refill: 2    4. Chronic bilateral low back pain, unspecified whether sciatica present  See above. Encouraged to schedule apt with PM  - tiZANidine (ZANAFLEX) 2 MG tablet; TAKE 1 TABLET BY MOUTH NIGHTLY AS NEEDED FOR  MUSCLE  SPASMS  Dispense: 30 tablet; Refill: 2    5. Anxiety state  Stable without medication. 6. Spinal stenosis, cervical region  See above. 7. Family history of colon cancer  Strongly encouraged to complete cologuard. Has refused cscope despite his increased risk of colon CA. HM:  Declines vaccines, AAA screening.        Return in about 3 months (around 1/4/2022)

## 2021-10-04 ENCOUNTER — OFFICE VISIT (OUTPATIENT)
Dept: PRIMARY CARE CLINIC | Age: 71
End: 2021-10-04
Payer: MEDICARE

## 2021-10-04 VITALS
WEIGHT: 222 LBS | DIASTOLIC BLOOD PRESSURE: 84 MMHG | HEART RATE: 100 BPM | SYSTOLIC BLOOD PRESSURE: 130 MMHG | BODY MASS INDEX: 30.07 KG/M2 | OXYGEN SATURATION: 97 % | HEIGHT: 72 IN | TEMPERATURE: 98 F

## 2021-10-04 DIAGNOSIS — R13.12 OROPHARYNGEAL DYSPHAGIA: ICD-10-CM

## 2021-10-04 DIAGNOSIS — G89.29 CHRONIC NECK PAIN: ICD-10-CM

## 2021-10-04 DIAGNOSIS — Z80.0 FAMILY HISTORY OF COLON CANCER: Chronic | ICD-10-CM

## 2021-10-04 DIAGNOSIS — M54.50 CHRONIC BILATERAL LOW BACK PAIN, UNSPECIFIED WHETHER SCIATICA PRESENT: ICD-10-CM

## 2021-10-04 DIAGNOSIS — F41.1 ANXIETY STATE: ICD-10-CM

## 2021-10-04 DIAGNOSIS — M48.02 SPINAL STENOSIS, CERVICAL REGION: ICD-10-CM

## 2021-10-04 DIAGNOSIS — E11.65 UNCONTROLLED TYPE 2 DIABETES MELLITUS WITH HYPERGLYCEMIA (HCC): Primary | ICD-10-CM

## 2021-10-04 DIAGNOSIS — G89.29 CHRONIC BILATERAL LOW BACK PAIN, UNSPECIFIED WHETHER SCIATICA PRESENT: ICD-10-CM

## 2021-10-04 DIAGNOSIS — M54.2 CHRONIC NECK PAIN: ICD-10-CM

## 2021-10-04 LAB — HBA1C MFR BLD: 8.8 %

## 2021-10-04 PROCEDURE — G8484 FLU IMMUNIZE NO ADMIN: HCPCS | Performed by: NURSE PRACTITIONER

## 2021-10-04 PROCEDURE — 3017F COLORECTAL CA SCREEN DOC REV: CPT | Performed by: NURSE PRACTITIONER

## 2021-10-04 PROCEDURE — 1123F ACP DISCUSS/DSCN MKR DOCD: CPT | Performed by: NURSE PRACTITIONER

## 2021-10-04 PROCEDURE — 2022F DILAT RTA XM EVC RTNOPTHY: CPT | Performed by: NURSE PRACTITIONER

## 2021-10-04 PROCEDURE — 4040F PNEUMOC VAC/ADMIN/RCVD: CPT | Performed by: NURSE PRACTITIONER

## 2021-10-04 PROCEDURE — G8427 DOCREV CUR MEDS BY ELIG CLIN: HCPCS | Performed by: NURSE PRACTITIONER

## 2021-10-04 PROCEDURE — 83036 HEMOGLOBIN GLYCOSYLATED A1C: CPT | Performed by: NURSE PRACTITIONER

## 2021-10-04 PROCEDURE — 99214 OFFICE O/P EST MOD 30 MIN: CPT | Performed by: NURSE PRACTITIONER

## 2021-10-04 PROCEDURE — 1036F TOBACCO NON-USER: CPT | Performed by: NURSE PRACTITIONER

## 2021-10-04 PROCEDURE — 3052F HG A1C>EQUAL 8.0%<EQUAL 9.0%: CPT | Performed by: NURSE PRACTITIONER

## 2021-10-04 PROCEDURE — G8417 CALC BMI ABV UP PARAM F/U: HCPCS | Performed by: NURSE PRACTITIONER

## 2021-10-04 RX ORDER — TIZANIDINE 2 MG/1
TABLET ORAL
Qty: 30 TABLET | Refills: 2 | Status: SHIPPED | OUTPATIENT
Start: 2021-10-04 | End: 2022-02-08 | Stop reason: CLARIF

## 2021-10-04 ASSESSMENT — ENCOUNTER SYMPTOMS
NAUSEA: 0
CHEST TIGHTNESS: 0
DIARRHEA: 0
ABDOMINAL PAIN: 0
WHEEZING: 0
BACK PAIN: 1
COUGH: 0
SHORTNESS OF BREATH: 1
COLOR CHANGE: 0
BLOOD IN STOOL: 0
TROUBLE SWALLOWING: 1
VOMITING: 0
ABDOMINAL DISTENTION: 0
CONSTIPATION: 0
VOICE CHANGE: 0
SORE THROAT: 0

## 2021-10-04 ASSESSMENT — PATIENT HEALTH QUESTIONNAIRE - PHQ9
2. FEELING DOWN, DEPRESSED OR HOPELESS: 0
1. LITTLE INTEREST OR PLEASURE IN DOING THINGS: 0
SUM OF ALL RESPONSES TO PHQ9 QUESTIONS 1 & 2: 0
SUM OF ALL RESPONSES TO PHQ QUESTIONS 1-9: 0

## 2021-10-12 ENCOUNTER — TELEPHONE (OUTPATIENT)
Dept: PRIMARY CARE CLINIC | Age: 71
End: 2021-10-12

## 2021-10-12 NOTE — PROGRESS NOTES
10/13/2021      TELEHEALTH EVALUATION -- Audio/Visual (During LTMKX-52 public health emergency)    HPI:    Cruzito Leija (:  1950) has requested an audio/video evaluation for the following concern(s):    Patient seen virtually for a problem visit. Complains of cough, sinus congestion, sinus pressure and pain. Cough is productive and seems to be worsening. No shortness of breath or wheezing. Nasal drainage is thick but clear. No sore throat. No earaches. Eyes are watery. Gets this every change of season. Requesting rx for keflex. Has taken benadryl     Review of Systems   Constitutional: Negative for activity change, appetite change, chills, fatigue and fever. HENT: Positive for congestion, postnasal drip, rhinorrhea, sinus pressure and sinus pain. Negative for ear pain, sore throat and trouble swallowing. Eyes: Positive for discharge and redness. Respiratory: Positive for cough. Negative for chest tightness, shortness of breath and wheezing. Cardiovascular: Negative for chest pain, palpitations and leg swelling. Gastrointestinal: Negative for abdominal pain, diarrhea, nausea and vomiting. Genitourinary: Negative for difficulty urinating. Musculoskeletal: Negative for myalgias. Skin: Negative for color change, pallor and rash. Neurological: Negative for dizziness, weakness, light-headedness and headaches. Hematological: Negative for adenopathy. Prior to Visit Medications    Medication Sig Taking?  Authorizing Provider   cephALEXin (KEFLEX) 500 MG capsule Take 1 capsule by mouth 3 times daily for 7 days Yes JORDAN Castanon CNP   guaiFENesin (MUCINEX) 600 MG extended release tablet Take 1 tablet by mouth 2 times daily for 15 days Yes JORDAN Castanon CNP   cetirizine (ZYRTEC) 10 MG tablet Take 1 tablet by mouth daily Yes JORDAN Del Valle CNP   tiZANidine (ZANAFLEX) 2 MG tablet TAKE 1 TABLET BY MOUTH NIGHTLY AS NEEDED FOR  MUSCLE  SPASMS Yes Patsy LAW Bramlee, APRN - CNP   omeprazole (PRILOSEC) 40 MG delayed release capsule Take 1 capsule by mouth every morning (before breakfast) Yes JORDAN Del Valle CNP   glipiZIDE (GLUCOTROL XL) 10 MG extended release tablet Take 1 tablet by mouth 2 times daily Yes JORDAN Del Valle CNP   Accu-Chek FastClix Lancets MISC USE 1 TO CHECK GLUCOSE TWICE DAILY Yes Historical Provider, MD   blood glucose monitor strips 100 strips by Other route Test 2times a day & as needed for symptoms of irregular blood glucose. Whatever  Insurance  Covers  Diabetes e11.9 Yes Historical Provider, MD   Lancets Misc.  MISC CHECK GLU BID AND PRN Yes JORDAN Del Valle CNP   blood glucose monitor strips Check glu BID and PRN Yes JORDAN Khan CNP   Blood Glucose Monitoring Suppl (BLOOD GLUCOSE MONITOR SYSTEM) w/Device KIT Check glu BID and PRN Whatever  Machine insurance  Covers Diabetes e11.9 Yes JORDAN Pastrana CNP       Social History     Tobacco Use    Smoking status: Former Smoker     Packs/day: 2.00     Years: 20.00     Pack years: 40.00     Types: Cigarettes    Smokeless tobacco: Never Used   Vaping Use    Vaping Use: Never used   Substance Use Topics    Alcohol use: Never    Drug use: Not on file        Allergies   Allergen Reactions    Sulfamethoxazole-Trimethoprim Nausea And Vomiting     Makes sick     ,   Past Medical History:   Diagnosis Date    Anxiety     Depression     Type 2 diabetes mellitus without complication (HCC)    ,   Past Surgical History:   Procedure Laterality Date    KNEE ARTHROSCOPY      NECK SURGERY         PHYSICAL EXAMINATION:  [ INSTRUCTIONS:  \"[x]\" Indicates a positive item  \"[]\" Indicates a negative item  -- DELETE ALL ITEMS NOT EXAMINED]  Vital Signs: (As obtained by patient/caregiver or practitioner observation)    Blood pressure-  Heart rate-    Respiratory rate-    Temperature-  Pulse oximetry-     Constitutional: [x] Appears well-developed and well-nourished [x] No apparent distress      [] Abnormal-   Mental status  [x] Alert and awake  [x] Oriented to person/place/time [x]Able to follow commands      Eyes:  EOM    [x]  Normal  [] Abnormal-  Sclera  [x]  Normal  [] Abnormal -         Discharge [x]  None visible  [] Abnormal -    HENT:   [x] Normocephalic, atraumatic. [] Abnormal   [x] Mouth/Throat: Mucous membranes are moist.     External Ears [x] Normal  [] Abnormal-     Neck: [x] No visualized mass     Pulmonary/Chest: [x] Respiratory effort normal.  [x] No visualized signs of difficulty breathing or respiratory distress        [] Abnormal-      Musculoskeletal:   [] Normal gait with no signs of ataxia         [x] Normal range of motion of neck        [] Abnormal-       Neurological:        [x] No Facial Asymmetry (Cranial nerve 7 motor function) (limited exam to video visit)          [x] No gaze palsy        [] Abnormal-         Skin:        [x] No significant exanthematous lesions or discoloration noted on facial skin         [] Abnormal-            Psychiatric:       [x] Normal Affect [] No Hallucinations        [] Abnormal-     Other pertinent observable physical exam findings-     ASSESSMENT/PLAN:  1. Upper respiratory tract infection, unspecified type  Start on anbx per patient request.   Add mucinex for congestion. Add antihistamine  Will call if no improvement. - cephALEXin (KEFLEX) 500 MG capsule; Take 1 capsule by mouth 3 times daily for 7 days  Dispense: 21 capsule; Refill: 0  - guaiFENesin (MUCINEX) 600 MG extended release tablet; Take 1 tablet by mouth 2 times daily for 15 days  Dispense: 30 tablet; Refill: 0    2. Cough  - guaiFENesin (MUCINEX) 600 MG extended release tablet; Take 1 tablet by mouth 2 times daily for 15 days  Dispense: 30 tablet; Refill: 0    3. Seasonal allergic rhinitis, unspecified trigger  - cetirizine (ZYRTEC) 10 MG tablet; Take 1 tablet by mouth daily  Dispense: 30 tablet;  Refill: 0      Return if symptoms worsen or fail to jonny Giovani Keith, was evaluated through a synchronous (real-time) audio-video encounter. The patient (or guardian if applicable) is aware that this is a billable service. Verbal consent to proceed has been obtained within the past 12 months. The visit was conducted pursuant to the emergency declaration under the [address] [place] [address] and the [employer] General Act. Patient identification was verified, and a caregiver was present when appropriate. The patient was located in a state where the provider was credentialed to provide care. Total time spent on this encounter: Not billed by time    --JORDAN Ferreira CNP on 10/13/2021 at 1:43 PM    An electronic signature was used to authenticate this note.

## 2021-10-12 NOTE — TELEPHONE ENCOUNTER
----- Message from Karri Darci sent at 10/11/2021  5:46 PM EDT -----  Subject: Message to Provider    QUESTIONS  Information for Provider? would like to have a rx for keflex having phlem   issues  ---------------------------------------------------------------------------  --------------  CALL BACK INFO  What is the best way for the office to contact you? OK to leave message on   voicemail  Preferred Call Back Phone Number? [phone]  ---------------------------------------------------------------------------  --------------  SCRIPT ANSWERS  Relationship to Patient?  Self

## 2021-10-13 ENCOUNTER — VIRTUAL VISIT (OUTPATIENT)
Dept: PRIMARY CARE CLINIC | Age: 71
End: 2021-10-13
Payer: MEDICARE

## 2021-10-13 DIAGNOSIS — J30.2 SEASONAL ALLERGIC RHINITIS, UNSPECIFIED TRIGGER: ICD-10-CM

## 2021-10-13 DIAGNOSIS — R05.9 COUGH: ICD-10-CM

## 2021-10-13 DIAGNOSIS — J06.9 UPPER RESPIRATORY TRACT INFECTION, UNSPECIFIED TYPE: Primary | ICD-10-CM

## 2021-10-13 PROCEDURE — G8484 FLU IMMUNIZE NO ADMIN: HCPCS | Performed by: NURSE PRACTITIONER

## 2021-10-13 PROCEDURE — G8427 DOCREV CUR MEDS BY ELIG CLIN: HCPCS | Performed by: NURSE PRACTITIONER

## 2021-10-13 PROCEDURE — G8417 CALC BMI ABV UP PARAM F/U: HCPCS | Performed by: NURSE PRACTITIONER

## 2021-10-13 PROCEDURE — 4040F PNEUMOC VAC/ADMIN/RCVD: CPT | Performed by: NURSE PRACTITIONER

## 2021-10-13 PROCEDURE — 1036F TOBACCO NON-USER: CPT | Performed by: NURSE PRACTITIONER

## 2021-10-13 PROCEDURE — 3017F COLORECTAL CA SCREEN DOC REV: CPT | Performed by: NURSE PRACTITIONER

## 2021-10-13 PROCEDURE — 1123F ACP DISCUSS/DSCN MKR DOCD: CPT | Performed by: NURSE PRACTITIONER

## 2021-10-13 PROCEDURE — 99213 OFFICE O/P EST LOW 20 MIN: CPT | Performed by: NURSE PRACTITIONER

## 2021-10-13 RX ORDER — CETIRIZINE HYDROCHLORIDE 10 MG/1
10 TABLET ORAL DAILY
Qty: 30 TABLET | Refills: 0 | Status: SHIPPED | OUTPATIENT
Start: 2021-10-13 | End: 2021-11-12

## 2021-10-13 RX ORDER — CEPHALEXIN 500 MG/1
500 CAPSULE ORAL 3 TIMES DAILY
Qty: 21 CAPSULE | Refills: 0 | Status: SHIPPED | OUTPATIENT
Start: 2021-10-13 | End: 2021-10-20

## 2021-10-13 RX ORDER — GUAIFENESIN 600 MG/1
600 TABLET, EXTENDED RELEASE ORAL 2 TIMES DAILY
Qty: 30 TABLET | Refills: 0 | Status: SHIPPED | OUTPATIENT
Start: 2021-10-13 | End: 2021-10-28

## 2021-10-13 ASSESSMENT — ENCOUNTER SYMPTOMS
SHORTNESS OF BREATH: 0
COLOR CHANGE: 0
DIARRHEA: 0
SORE THROAT: 0
EYE REDNESS: 1
CHEST TIGHTNESS: 0
EYE DISCHARGE: 1
ABDOMINAL PAIN: 0
TROUBLE SWALLOWING: 0
RHINORRHEA: 1
VOMITING: 0
SINUS PAIN: 1
WHEEZING: 0
COUGH: 1
SINUS PRESSURE: 1
NAUSEA: 0

## 2022-02-03 NOTE — PROGRESS NOTES
ENCOUNTER DATE: 2022     NAME: Josué Mancilla   AGE: 80 y.o. GENDER: male   YOB: 1950    Patient Active Problem List   Diagnosis    Spinal stenosis, cervical region    Multiple pulmonary nodules    Anxiety state    Chronic neck pain    Insomnia    Elevated PSA    Family history of colon cancer    Chronic pain of left knee    Uncontrolled type 2 diabetes mellitus with hyperglycemia (HCC)    Oropharyngeal dysphagia    Chronic bilateral low back pain    Abscess    Seasonal allergic rhinitis    Right hip pain    Colon cancer screening declined      Allergies   Allergen Reactions    Sulfamethoxazole-Trimethoprim Nausea And Vomiting     Makes sick       Current Outpatient Medications on File Prior to Visit   Medication Sig Dispense Refill    Accu-Chek FastClix Lancets MISC USE 1 TO CHECK GLUCOSE TWICE DAILY      blood glucose monitor strips 100 strips by Other route Test 2times a day & as needed for symptoms of irregular blood glucose. Whatever  Insurance  Covers  Diabetes e11.9      Lancets Misc. MISC CHECK GLU BID AND  each 1    blood glucose monitor strips Check glu BID and  strip 3    Blood Glucose Monitoring Suppl (BLOOD GLUCOSE MONITOR SYSTEM) w/Device KIT Check glu BID and PRN Whatever  Machine insurance  Covers Diabetes e11.9 1 kit 1    omeprazole (PRILOSEC) 40 MG delayed release capsule Take 1 capsule by mouth every morning (before breakfast) (Patient not taking: Reported on 2022) 90 capsule 1     No current facility-administered medications on file prior to visit.         Social History     Tobacco Use    Smoking status: Former Smoker     Packs/day: 2.00     Years: 20.00     Pack years: 40.00     Types: Cigarettes     Quit date:      Years since quittin.1    Smokeless tobacco: Never Used   Substance Use Topics    Alcohol use: Never      CARE TEAM  Patient Care Team:  JORDAN Moulton CNP as PCP - General (Family Nurse Practitioner)  JORDAN Stovall - CNP as PCP - [facility] Empaneled Provider    Chief Complaint   Patient presents with    Medicare AWV    Diabetes      HPI:   Patient is here for a follow up visit and AWV    DMII:  Last a1c 8.8  On glipizide XL 10mg bid  Doesn't check glu often  Diet has been better. Limiting sweets more now. Hasn't tolerated metformin. Shan Charter for a while, not sure why he stopped it.    Janeth Ahumada was too expensive  Refuses statin and ACEI     PAIN:  Referred to ortho and PM for chronic neck, knee and back pain. Never scheduled apts. Requesting rx for vicoden. Wants to see ortho for an injection. Not taking any nsaids. Smokes marijuana frequently. COLON CA SCREEN:  +FH colon cancer (twin brother). Had cscope about 10 yrs ago and was normal. Has refused any further colonoscopy  Has received new cologuard kit in the mail. The previous one he submitted was . Still has not completed. HM:  Due for pneumonia, shingles, flu and covid vaccines. Had AAA screening via [employer] last year. Report in chart. declined. ROS:  Review of Systems   Constitutional: Negative for activity change, appetite change, chills, fatigue and unexpected weight change. HENT: Positive for trouble swallowing. Negative for congestion, ear pain, hearing loss, nosebleeds, postnasal drip, sneezing, sore throat and voice change. Hearing loss   Respiratory: Positive for shortness of breath (chronic). Negative for cough, chest tightness and wheezing. Pulmonary nodules monitored per annual CT per pulm   Cardiovascular: Negative for chest pain, palpitations and leg swelling. Gastrointestinal: Negative for abdominal distention, abdominal pain, blood in stool, constipation, diarrhea, nausea and vomiting. Genitourinary: Negative for difficulty urinating and dysuria.         History of elevated PSA   Musculoskeletal: Positive for arthralgias (chronic neck, back and left knee pain), back pain (chronic), gait problem and neck pain (s/p multiple surgeries). Negative for neck stiffness. Skin: Negative for color change, pallor and rash. Neurological: Negative for dizziness, tremors, seizures, numbness and headaches. Hematological: Does not bruise/bleed easily. Psychiatric/Behavioral: Positive for sleep disturbance. Negative for agitation and dysphoric mood. The patient is not nervous/anxious. VITALS:  /84   Pulse 78   Temp 98.2 °F (36.8 °C) (Oral)   Ht 6' (1.829 m)   Wt 224 lb (101.6 kg)   SpO2 98%   BMI 30.38 kg/m²    BP Readings from Last 3 Encounters:   02/08/22 130/84   10/04/21 130/84   07/01/21 124/76     Wt Readings from Last 3 Encounters:   02/08/22 224 lb (101.6 kg)   10/04/21 222 lb (100.7 kg)   07/01/21 224 lb (101.6 kg)     PE:  Physical Exam  Vitals and nursing note reviewed. Constitutional:       General: He is not in acute distress. Appearance: Normal appearance. He is well-developed and normal weight. He is not diaphoretic. HENT:      Head: Normocephalic and atraumatic. Neck:      Vascular: No carotid bruit. Cardiovascular:      Rate and Rhythm: Normal rate and regular rhythm. Heart sounds: Normal heart sounds. No murmur heard. No friction rub. Pulmonary:      Effort: Pulmonary effort is normal. No respiratory distress. Breath sounds: No wheezing, rhonchi or rales. Abdominal:      General: Abdomen is flat. There is no distension. Palpations: Abdomen is soft. Musculoskeletal:      Right lower leg: No edema. Left lower leg: No edema. Lymphadenopathy:      Cervical: No cervical adenopathy. Skin:     General: Skin is warm and dry. Coloration: Skin is not pale. Findings: No erythema or rash. Neurological:      General: No focal deficit present. Mental Status: He is alert and oriented to person, place, and time.       Gait: Gait normal.   Psychiatric:         Mood and Affect: Mood normal.         Behavior: Behavior normal.        Results for POC orders placed in visit on 02/08/22   POCT glycosylated hemoglobin (Hb A1C)   Result Value Ref Range    Hemoglobin A1C 10.3 %       ASSESSMENT/PLAN:  1. Routine general medical examination at a health care facility  AWV completed. See below. 2. Uncontrolled type 2 diabetes mellitus with hyperglycemia (HCC)  A1C now up to 10.3  Discussed concerns in detail with uncontrolled DMII  Patient refuses to take any injection, even if weekly. Refuses statin, metformin and ACEI. Willing to try Grey Amanda again. rx sent. Will call if too expensive. Strongly encouraged to change dietary habits and increase physical activity. Check fasting labs today. Proceed pending results. - glipiZIDE (GLUCOTROL XL) 10 MG extended release tablet; Take 1 tablet by mouth 2 times daily  Dispense: 180 tablet; Refill: 1  - dapagliflozin (FARXIGA) 10 MG tablet; Take 1 tablet by mouth every morning  Dispense: 30 tablet; Refill: 5  - Comprehensive Metabolic Panel; Future  - Lipid Panel; Future  - POCT glycosylated hemoglobin (Hb A1C)    3. Spinal stenosis, cervical region  Previously referred to ortho and neurosx, never scheduled. Patient requesting rx for vicodin. Again discussed that I will not prescribe controlled pain medication under his circumstances of \"taking pills his friends give him\" and smoking marijuana. Will try mobic. rx sent. - External Referral To Orthopedic Surgery    4. Chronic pain of left knee  New referral placed. Patient will call for apt. Interested in injection. Trial of mobic for chronic OA pain  - External Referral To Orthopedic Surgery  - meloxicam (MOBIC) 15 MG tablet; Take 1 tablet by mouth daily as needed for Pain  Dispense: 30 tablet; Refill: 5    5. Chronic bilateral low back pain, unspecified whether sciatica present  Previously referred to ortho and neurosx, never scheduled. Patient requesting rx for vicodin.  Again discussed that I will not prescribe controlled pain medication under his circumstances of \"taking pills his friends give him\" and smoking marijuana. Will try mobic. rx sent. - External Referral To Orthopedic Surgery    6. Right hip pain  Refer to ortho. New referral placed. Patient will call for apt. Trial of mobic for OA pain  - External Referral To Orthopedic Surgery    7. Family history of colon cancer  We have had several conversations re: colon cancer screening. Patient adamantly refuses to have cscope, despite his twin brother dying from colon cancer  Has cologuard kit at home. Strongly encouraged him to complete and return. 8. Colon cancer screening declined      Return in 3 months (on 2022) for Medicare Annual Wellness Visit in 1 year and 3mo follow up for diabetes. .               Electronically signed by JORDAN Higginbotham CNP on 2022 at 1:06 PM   Medicare Annual Wellness Visit  Name: Daisy Freeman Date: 2022   MRN: [id number] Sex: Male   Age: 80 y.o. Ethnicity: Non- / Non    : 1950 Race: White (non-)      Brittani De La Rosa is here for Medicare AWV and Diabetes    Screenings for behavioral, psychosocial and functional/safety risks, and cognitive dysfunction are all negative except as indicated below. These results, as well as other patient data from the [address] form, are documented in Flowsheets linked to this Encounter. Allergies   Allergen Reactions    Sulfamethoxazole-Trimethoprim Nausea And Vomiting     Makes sick           Prior to Visit Medications    Medication Sig Taking?  Authorizing Provider   glipiZIDE (GLUCOTROL XL) 10 MG extended release tablet Take 1 tablet by mouth 2 times daily Yes JORDAN Del Valle CNP   meloxicam (MOBIC) 15 MG tablet Take 1 tablet by mouth daily as needed for Pain Yes JORDAN Wilks CNP   dapagliflozin (FARXIGA) 10 MG tablet Take 1 tablet by mouth every morning Yes JORDAN Aponte CNP   Accu-Chek FastClix Lancets MISC USE 1 TO CHECK GLUCOSE TWICE DAILY Yes Historical Provider, MD   blood glucose monitor strips 100 strips by Other route Test 2times a day & as needed for symptoms of irregular blood glucose. Whatever  Insurance  Covers  Diabetes e11.9 Yes Historical Provider, MD   Lancets Misc. MISC CHECK GLU BID AND PRN Yes JORDAN Del Valle CNP   blood glucose monitor strips Check glu BID and PRN Yes JORDAN Rosa CNP   Blood Glucose Monitoring Suppl (BLOOD GLUCOSE MONITOR SYSTEM) w/Device KIT Check glu BID and PRN Whatever  Machine insurance  Covers Diabetes e11.9 Yes JORDAN Rosa CNP   omeprazole (PRILOSEC) 40 MG delayed release capsule Take 1 capsule by mouth every morning (before breakfast)  Patient not taking: Reported on 2/8/2022  JORDAN Shahid CNP         Past Medical History:   Diagnosis Date    Anxiety     Depression     Type 2 diabetes mellitus without complication ([facility] [address]     Type 2 diabetes mellitus without complication, without long-term current use of insulin ([facility] [address] 3/4/2020       Past Surgical History:   Procedure Laterality Date    KNEE ARTHROSCOPY      NECK SURGERY         History reviewed. No pertinent family history. CareTeam (Including outside providers/suppliers regularly involved in providing care):   Patient Care Team:  JORDAN Shahid CNP as PCP - General (Family Nurse Practitioner)  JORDAN Shahid CNP as PCP - [facility] Empaneled Provider    Wt Readings from Last 3 Encounters:   02/08/22 224 lb (101.6 kg)   10/04/21 222 lb (100.7 kg)   07/01/21 224 lb (101.6 kg)     Vitals:    02/08/22 1154   BP: 130/84   Pulse: 78   Temp: 98.2 °F (36.8 °C)   TempSrc: Oral   SpO2: 98%   Weight: 224 lb (101.6 kg)   Height: 6' (1.829 m)     Body mass index is 30.38 kg/m². Based upon direct observation of the patient, evaluation of cognition reveals recent and remote memory intact.     Patient's complete Health Risk Assessment and screening values have been reviewed and are found in [facility]. The following problems were reviewed today and where indicated follow up appointments were made and/or referrals ordered. Positive Risk Factor Screenings with Interventions:              General Health and ACP:  General  In general, how would you say your health is?: Good  In the past 7 days, have you experienced any of the following?  New or Increased Pain, New or Increased Fatigue, Loneliness, Social Isolation, Stress or Anger?: (!) New or Increased Pain  Do you get the social and emotional support that you need?: Yes  Do you have a Living Will?: (!) No  Advance Directives     Power of  Living Will ACP-Advance Directive ACP-Power of     Not on File Not on File Not on File Not on File      General Health Risk Interventions:  · Pain issues: pain management referral ordered    Health Habits/Nutrition:  Health Habits/Nutrition  Do you exercise for at least 20 minutes 2-3 times per week?: (!) No  Have you lost any weight without trying in the past 3 months?: No  Do you eat only one meal per day?: No  Have you seen the dentist within the past year?: (!) No  Body mass index: (!) 30.38  Health Habits/Nutrition Interventions:  · Inadequate physical activity:  patient is not ready to increase his/her physical activity level at this time    Hearing/Vision:  No exam data present  Hearing/Vision  Do you or your family notice any trouble with your hearing that hasn't been managed with hearing aids?: (!) Yes  Do you have difficulty driving, watching TV, or doing any of your daily activities because of your eyesight?: No  Have you had an eye exam within the past year?: Yes  Hearing/Vision Interventions:  · Hearing concerns:  patient declines any further evaluation/treatment for hearing issues    Safety:  Safety  Do you have working smoke detectors?: Yes  Have all throw rugs been removed or fastened?: (!) No  Do you have non-slip mats or surfaces in all bathtubs/showers?: (!) No  Do all of your stairways have a railing or banister?: (!) No  Are your doorways, halls and stairs free of clutter?: Yes  Do you always fasten your seatbelt when you are in a car?: Yes  Safety Interventions:  · Home safety tips provided    ADL:  ADLs  In the past 7 days, did you need help from others to perform any of the following everyday activities? Eating, dressing, grooming, bathing, toileting, or walking/balance?: None  In the past 7 days, did you need help from others to take care of any of the following? Laundry, housekeeping, banking/finances, shopping, telephone use, food preparation, transportation, or taking medications?: (!) Housekeeping  ADL Interventions:  · Patient declines any further evaluation/treatment for this issue      Personalized Preventive Plan   Current Health Maintenance Status  Immunization History   Administered Date(s) Administered    COVID-19, Pfizer Purple top, DILUTE for use, 12+ yrs, 30mcg/0.3mL dose 04/09/2021, 04/30/2021, 11/01/2021    Td vaccine (adult) 05/26/2004    Tdap (Boostrix, Adacel) 05/05/2010        Health Maintenance   Topic Date Due    Colon cancer screen colonoscopy  Never done    Shingles Vaccine (1 of 2) Never done    Pneumococcal 65+ years Vaccine (1 of 1 - PPSV23) Never done    DTaP/Tdap/Td vaccine (2 - Td or Tdap) 05/05/2020    Diabetic foot exam  09/08/2021    Lipid screen  12/07/2021    Annual Wellness Visit (AWV)  01/28/2022    Flu vaccine (1) 10/04/2022 (Originally 9/1/2021)    A1C test (Diabetic or Prediabetic)  05/08/2022    Diabetic microalbuminuria test  10/04/2022    Depression Screen  10/04/2022    Diabetic retinal exam  10/19/2022    COVID-19 Vaccine  Completed    Hepatitis A vaccine  Aged Out    Hib vaccine  Aged Out    Meningococcal (ACWY) vaccine  Aged Out    AAA screen  Discontinued    Hepatitis C screen  Discontinued     Recommendations for Preventive Services Due: see orders and patient instructions/AVS.  .   Recommended screening schedule for the next 5-10 years is provided to the patient in written form: see Patient Aubree Franco was seen today for medicare awv. Diagnoses and all orders for this visit:    Routine general medical examination at a health care facility  -AWV completed. -AAA screening utd. Report in media from [address]  -refuses pneumonia vaccine.   -refuses cscope. Encouraged to complete cologuard. Has kit at home. Will check expiration date  -declines shingles vaccine. Uncontrolled type 2 diabetes mellitus with hyperglycemia ([facility] [address]  See above. -     glipiZIDE (GLUCOTROL XL) 10 MG extended release tablet; Take 1 tablet by mouth 2 times daily  -     dapagliflozin (FARXIGA) 10 MG tablet; Take 1 tablet by mouth every morning  -     Comprehensive Metabolic Panel; Future  -     Lipid Panel; Future  -     POCT glycosylated hemoglobin (Hb A1C)    Spinal stenosis, cervical region  -     External Referral To Orthopedic Surgery    Chronic pain of left knee  -     External Referral To Orthopedic Surgery  -     meloxicam (MOBIC) 15 MG tablet;  Take 1 tablet by mouth daily as needed for Pain    Chronic bilateral low back pain, unspecified whether sciatica present  -     External Referral To Orthopedic Surgery    Right hip pain  -     External Referral To Orthopedic Surgery    Family history of colon cancer

## 2022-02-08 ENCOUNTER — OFFICE VISIT (OUTPATIENT)
Dept: PRIMARY CARE CLINIC | Age: 72
End: 2022-02-08
Payer: MEDICARE

## 2022-02-08 ENCOUNTER — TELEPHONE (OUTPATIENT)
Dept: PRIMARY CARE CLINIC | Age: 72
End: 2022-02-08

## 2022-02-08 VITALS
WEIGHT: 224 LBS | DIASTOLIC BLOOD PRESSURE: 84 MMHG | OXYGEN SATURATION: 98 % | HEART RATE: 78 BPM | BODY MASS INDEX: 30.34 KG/M2 | HEIGHT: 72 IN | TEMPERATURE: 98.2 F | SYSTOLIC BLOOD PRESSURE: 130 MMHG

## 2022-02-08 DIAGNOSIS — M48.02 SPINAL STENOSIS, CERVICAL REGION: ICD-10-CM

## 2022-02-08 DIAGNOSIS — G89.29 CHRONIC PAIN OF LEFT KNEE: ICD-10-CM

## 2022-02-08 DIAGNOSIS — E11.65 UNCONTROLLED TYPE 2 DIABETES MELLITUS WITH HYPERGLYCEMIA (HCC): ICD-10-CM

## 2022-02-08 DIAGNOSIS — Z80.0 FAMILY HISTORY OF COLON CANCER: Chronic | ICD-10-CM

## 2022-02-08 DIAGNOSIS — M54.50 CHRONIC BILATERAL LOW BACK PAIN, UNSPECIFIED WHETHER SCIATICA PRESENT: ICD-10-CM

## 2022-02-08 DIAGNOSIS — M25.562 CHRONIC PAIN OF LEFT KNEE: ICD-10-CM

## 2022-02-08 DIAGNOSIS — Z53.20 COLON CANCER SCREENING DECLINED: ICD-10-CM

## 2022-02-08 DIAGNOSIS — G89.29 CHRONIC BILATERAL LOW BACK PAIN, UNSPECIFIED WHETHER SCIATICA PRESENT: ICD-10-CM

## 2022-02-08 DIAGNOSIS — M25.551 RIGHT HIP PAIN: ICD-10-CM

## 2022-02-08 DIAGNOSIS — Z00.00 ROUTINE GENERAL MEDICAL EXAMINATION AT A HEALTH CARE FACILITY: Primary | ICD-10-CM

## 2022-02-08 PROBLEM — E11.9 TYPE 2 DIABETES MELLITUS WITHOUT COMPLICATION, WITHOUT LONG-TERM CURRENT USE OF INSULIN (HCC): Status: RESOLVED | Noted: 2020-03-04 | Resolved: 2022-02-08

## 2022-02-08 LAB — HBA1C MFR BLD: 10.3 %

## 2022-02-08 PROCEDURE — G0439 PPPS, SUBSEQ VISIT: HCPCS | Performed by: NURSE PRACTITIONER

## 2022-02-08 PROCEDURE — 83036 HEMOGLOBIN GLYCOSYLATED A1C: CPT | Performed by: NURSE PRACTITIONER

## 2022-02-08 PROCEDURE — 99214 OFFICE O/P EST MOD 30 MIN: CPT | Performed by: NURSE PRACTITIONER

## 2022-02-08 RX ORDER — MELOXICAM 15 MG/1
15 TABLET ORAL DAILY PRN
Qty: 30 TABLET | Refills: 5 | Status: SHIPPED | OUTPATIENT
Start: 2022-02-08

## 2022-02-08 RX ORDER — PIOGLITAZONEHYDROCHLORIDE 30 MG/1
30 TABLET ORAL DAILY
Qty: 30 TABLET | Refills: 2 | Status: SHIPPED | OUTPATIENT
Start: 2022-02-08

## 2022-02-08 RX ORDER — GLIPIZIDE 10 MG/1
10 TABLET, FILM COATED, EXTENDED RELEASE ORAL 2 TIMES DAILY
Qty: 180 TABLET | Refills: 1 | Status: SHIPPED | OUTPATIENT
Start: 2022-02-08

## 2022-02-08 ASSESSMENT — ENCOUNTER SYMPTOMS
ABDOMINAL PAIN: 0
SORE THROAT: 0
BLOOD IN STOOL: 0
NAUSEA: 0
VOMITING: 0
SHORTNESS OF BREATH: 1
CONSTIPATION: 0
DIARRHEA: 0
COLOR CHANGE: 0
ABDOMINAL DISTENTION: 0
TROUBLE SWALLOWING: 1
CHEST TIGHTNESS: 0
BACK PAIN: 1
WHEEZING: 0
VOICE CHANGE: 0
COUGH: 0

## 2022-02-08 ASSESSMENT — PATIENT HEALTH QUESTIONNAIRE - PHQ9
2. FEELING DOWN, DEPRESSED OR HOPELESS: 3
SUM OF ALL RESPONSES TO PHQ QUESTIONS 1-9: 3
5. POOR APPETITE OR OVEREATING: 0
3. TROUBLE FALLING OR STAYING ASLEEP: 0
8. MOVING OR SPEAKING SO SLOWLY THAT OTHER PEOPLE COULD HAVE NOTICED. OR THE OPPOSITE, BEING SO FIGETY OR RESTLESS THAT YOU HAVE BEEN MOVING AROUND A LOT MORE THAN USUAL: 0
SUM OF ALL RESPONSES TO PHQ QUESTIONS 1-9: 3
SUM OF ALL RESPONSES TO PHQ QUESTIONS 1-9: 3
SUM OF ALL RESPONSES TO PHQ9 QUESTIONS 1 & 2: 3
10. IF YOU CHECKED OFF ANY PROBLEMS, HOW DIFFICULT HAVE THESE PROBLEMS MADE IT FOR YOU TO DO YOUR WORK, TAKE CARE OF THINGS AT HOME, OR GET ALONG WITH OTHER PEOPLE: 0
6. FEELING BAD ABOUT YOURSELF - OR THAT YOU ARE A FAILURE OR HAVE LET YOURSELF OR YOUR FAMILY DOWN: 0
9. THOUGHTS THAT YOU WOULD BE BETTER OFF DEAD, OR OF HURTING YOURSELF: 0
1. LITTLE INTEREST OR PLEASURE IN DOING THINGS: 0
SUM OF ALL RESPONSES TO PHQ QUESTIONS 1-9: 3
4. FEELING TIRED OR HAVING LITTLE ENERGY: 0
7. TROUBLE CONCENTRATING ON THINGS, SUCH AS READING THE NEWSPAPER OR WATCHING TELEVISION: 0

## 2022-02-08 NOTE — PATIENT INSTRUCTIONS
Advance Directives: Care Instructions  Overview  An advance directive is a legal way to state your wishes at the end of your life. It tells your family and your doctor what to do if you can't say what you want. There are two main types of advance directives. You can change them any time your wishes change. Living will. This form tells your family and your doctor your wishes about life support and other treatment. The form is also called a declaration. Medical power of . This form lets you name a person to make treatment decisions for you when you can't speak for yourself. This person is called a health care agent (health care proxy, health care surrogate). The form is also called a durable power of  for health care. If you do not have an advance directive, decisions about your medical care may be made by a family member, or by a doctor or a  who doesn't know you. It may help to think of an advance directive as a gift to the people who care for you. If you have one, they won't have to make tough decisions by themselves. Follow-up care is a key part of your treatment and safety. Be sure to make and go to all appointments, and call your doctor if you are having problems. It's also a good idea to know your test results and keep a list of the medicines you take. What should you include in an advance directive? Many states have a unique advance directive form. (It may ask you to address specific issues.) Or you might use a universal form that's approved by many states. If your form doesn't tell you what to address, it may be hard to know what to include in your advance directive. Use the questions below to help you get started. · Who do you want to make decisions about your medical care if you are not able to? · What life-support measures do you want if you have a serious illness that gets worse over time or can't be cured? · What are you most afraid of that might happen?  (Maybe you're afraid of having pain, losing your independence, or being kept alive by machines.)  · Where would you prefer to die? (Your home? A hospital? A nursing home?)  · Do you want to donate your organs when you die? · Do you want certain Hindu practices performed before you die? When should you call for help? Be sure to contact your doctor if you have any questions. Where can you learn more? Go to [email or web address] and sign in to your [employer] account. Enter R264 in the [employer] box to learn more about \"Advance Directives: Care Instructions. \"     If you do not have an account, please click on the \"Sign Up Now\" link. Current as of: March 17, 2021               Content Version: 13.1  © 0203-3184 Healthwise, Incorporated. Care instructions adapted under license by [facility] ([facility]). If you have questions about a medical condition or this instruction, always ask your healthcare professional. [place] [address] any warranty or liability for your use of this information. Eating Healthy Foods: Care Instructions  Your Care Instructions     Eating healthy foods can help lower your risk for disease. Healthy food gives you energy and keeps your heart strong, your brain active, your muscles working, and your bones strong. A healthy diet includes a variety of foods from the basic food groups: grains, vegetables, fruits, milk and milk products, and meat and beans. Some people may eat more of their favorite foods from only one food group and, as a result, miss getting the nutrients they need. So, it is important to pay attention not only to what you eat but also to what you are missing from your diet. You can eat a healthy, balanced diet by making a few small changes. Follow-up care is a key part of your treatment and safety. Be sure to make and go to all appointments, and call your doctor if you are having problems.  It's also a good idea to know your test results and keep a list of the medicines you take. How can you care for yourself at home? Look at what you eat  · Keep a food diary for a week or two and record everything you eat or drink. Track the number of servings you eat from each food group. · For a balanced diet every day, eat a variety of:  ? 6 or more ounce-equivalents of grains, such as cereals, breads, crackers, rice, or pasta, every day. An ounce-equivalent is 1 slice of bread, 1 cup of ready-to-eat cereal, or ½ cup of cooked rice, cooked pasta, or cooked cereal.  ? 2½ cups of vegetables, especially:  § Dark-green vegetables such as broccoli and spinach. § Orange vegetables such as carrots and sweet potatoes. § Dry beans (such as gutierrez and kidney beans) and peas (such as lentils). ? 2 cups of fresh, frozen, or canned fruit. A small apple or 1 banana or orange equals 1 cup. ? 3 cups of nonfat or low-fat milk, yogurt, or other milk products. ? 5½ ounces of meat and beans, such as chicken, fish, lean meat, beans, nuts, and seeds. One egg, 1 tablespoon of peanut butter, ½ ounce nuts or seeds, or ¼ cup of cooked beans equals 1 ounce of meat. · Learn how to read food labels for serving sizes and ingredients. Fast-food and convenience-food meals often contain few or no fruits or vegetables. Make sure you eat some fruits and vegetables to make the meal more nutritious. · Look at your food diary. For each food group, add up what you have eaten and then divide the total by the number of days. This will give you an idea of how much you are eating from each food group. See if you can find some ways to change your diet to make it more healthy. Start small  · Do not try to make dramatic changes to your diet all at once. You might feel that you are missing out on your favorite foods and then be more likely to fail. · Start slowly, and gradually change your habits. Try some of the following:  ? Use whole wheat bread instead of white bread. ?  Use nonfat or low-fat milk instead of whole milk. ? Eat brown rice instead of white rice, and eat whole wheat pasta instead of white-flour pasta. ? Try low-fat cheeses and low-fat yogurt. ? Add more fruits and vegetables to meals and have them for snacks. ? Add lettuce, tomato, cucumber, and onion to sandwiches. ? Add fruit to yogurt and cereal.  Enjoy food  · You can still eat your favorite foods. You just may need to eat less of them. If your favorite foods are high in fat, salt, and sugar, limit how often you eat them, but do not cut them out entirely. · Eat a wide variety of foods. Make healthy choices when eating out  · The type of restaurant you choose can help you make healthy choices. Even fast-food chains are now offering more low-fat or healthier choices on the menu. · Choose smaller portions, or take half of your meal home. · When eating out, try:  ? A veggie pizza with a whole wheat crust or grilled chicken (instead of sausage or pepperoni). ? Pasta with roasted vegetables, grilled chicken, or marinara sauce instead of cream sauce. ? A vegetable wrap or grilled chicken wrap. ? Broiled or poached food instead of fried or breaded items. Make healthy choices easy  · Buy packaged, prewashed, ready-to-eat fresh vegetables and fruits, such as baby carrots, salad mixes, and chopped or shredded broccoli and cauliflower. · Buy packaged, presliced fruits, such as melon or pineapple. · Choose 100% fruit or vegetable juice instead of soda. Limit juice intake to 4 to 6 oz (½ to ¾ cup) a day. · Blend low-fat yogurt, fruit juice, and canned or frozen fruit to make a smoothie for breakfast or a snack. Where can you learn more? Go to https://[email or web address]. org and sign in to your [employer] account. Enter Z708 in the [facility] box to learn more about \"Eating Healthy Foods: Care Instructions. \"     If you do not have an account, please click on the \"Sign Up Now\" link.   Current as of: September 8, 2021               Content Version: 13.1  © 7792-6081 Healthwise, [email or web address]. Care instructions adapted under license by [facility] ([facility]). If you have questions about a medical condition or this instruction, always ask your healthcare professional. [address] any warranty or liability for your use of this information. Personalized Preventive Plan for Hyacinth Delaney - 2/8/2022  Medicare offers a range of preventive health benefits. Some of the tests and screenings are paid in full while other may be subject to a deductible, co-insurance, and/or copay. Some of these benefits include a comprehensive review of your medical history including lifestyle, illnesses that may run in your family, and various assessments and screenings as appropriate. After reviewing your medical record and screening and assessments performed today your provider may have ordered immunizations, labs, imaging, and/or referrals for you. A list of these orders (if applicable) as well as your Preventive Care list are included within your After Visit Summary for your review. Other Preventive Recommendations:    · A preventive eye exam performed by an eye specialist is recommended every 1-2 years to screen for glaucoma; cataracts, macular degeneration, and other eye disorders. · A preventive dental visit is recommended every 6 months. · Try to get at least 150 minutes of exercise per week or 10,000 steps per day on a pedometer . · Order or download the FREE \"Exercise & Physical Activity: Your Everyday Guide\" from [employer]. Call 1-514.799.1937 or search [employer] online. · You need 1087-0821 mg of calcium and 0725-3921 IU of vitamin D per day.  It is possible to meet your calcium requirement with diet alone, but a vitamin D supplement is usually necessary to meet this goal.  · When exposed to the sun, use a sunscreen that protects against both UVA and UVB radiation with an SPF of 30 or greater. Reapply every 2 to 3 hours or after sweating, drying off with a towel, or swimming. · Always wear a seat belt when traveling in a car. Always wear a helmet when riding a bicycle or motorcycle.

## 2022-02-16 ENCOUNTER — TELEPHONE (OUTPATIENT)
Dept: PRIMARY CARE CLINIC | Age: 72
End: 2022-02-16

## 2022-02-16 RX ORDER — TIZANIDINE 2 MG/1
2 TABLET ORAL 3 TIMES DAILY PRN
Qty: 30 TABLET | Refills: 2 | Status: SHIPPED | OUTPATIENT
Start: 2022-02-16

## 2022-04-19 ENCOUNTER — TELEPHONE (OUTPATIENT)
Dept: PRIMARY CARE CLINIC | Age: 72
End: 2022-04-19

## 2022-05-09 ENCOUNTER — TELEPHONE (OUTPATIENT)
Dept: PRIMARY CARE CLINIC | Age: 72
End: 2022-05-09

## 2022-07-26 DIAGNOSIS — G89.29 CHRONIC PAIN OF LEFT KNEE: ICD-10-CM

## 2022-07-26 DIAGNOSIS — M25.562 CHRONIC PAIN OF LEFT KNEE: ICD-10-CM

## 2022-07-26 RX ORDER — MELOXICAM 15 MG/1
TABLET ORAL
Qty: 30 TABLET | Refills: 0 | OUTPATIENT
Start: 2022-07-26

## 2022-07-26 NOTE — TELEPHONE ENCOUNTER
Medication:   Requested Prescriptions     Pending Prescriptions Disp Refills    meloxicam (MOBIC) 15 MG tablet [Pharmacy Med Name: Meloxicam 15 MG Oral Tablet] 30 tablet 0     Sig: TAKE 1 TABLET BY MOUTH ONCE DAILY AS NEEDED FOR PAIN     Last Filled:  2.8.22    Last appt: 2/8/2022   Next appt: Visit date not found    Last OARRS: No flowsheet data found.

## 2022-09-11 DIAGNOSIS — E11.65 UNCONTROLLED TYPE 2 DIABETES MELLITUS WITH HYPERGLYCEMIA (HCC): ICD-10-CM

## 2022-09-12 RX ORDER — GLIPIZIDE 10 MG/1
TABLET, FILM COATED, EXTENDED RELEASE ORAL
Qty: 180 TABLET | Refills: 0 | OUTPATIENT
Start: 2022-09-12

## 2022-09-12 NOTE — TELEPHONE ENCOUNTER
Medication:   Requested Prescriptions     Pending Prescriptions Disp Refills    glipiZIDE (GLUCOTROL XL) 10 MG extended release tablet [Pharmacy Med Name: glipiZIDE ER 10 MG Oral Tablet Extended Release 24 Hour] 180 tablet 0     Sig: Take 1 tablet by mouth twice daily     Last Filled:  2.8.22    Last appt: 2/8/2022   Next appt: Visit date not found    Last Labs DM:   Lab Results   Component Value Date/Time    LABA1C 9.0 04/22/2022 12:00 AM    LABA1C 9.0 04/22/2022 12:00 AM